# Patient Record
Sex: MALE | Race: ASIAN | NOT HISPANIC OR LATINO | Employment: UNEMPLOYED | URBAN - METROPOLITAN AREA
[De-identification: names, ages, dates, MRNs, and addresses within clinical notes are randomized per-mention and may not be internally consistent; named-entity substitution may affect disease eponyms.]

---

## 2020-05-29 ENCOUNTER — OFFICE VISIT (OUTPATIENT)
Dept: URGENT CARE | Facility: CLINIC | Age: 55
End: 2020-05-29
Payer: COMMERCIAL

## 2020-05-29 VITALS
DIASTOLIC BLOOD PRESSURE: 84 MMHG | SYSTOLIC BLOOD PRESSURE: 135 MMHG | OXYGEN SATURATION: 99 % | HEIGHT: 64 IN | HEART RATE: 92 BPM | WEIGHT: 129 LBS | TEMPERATURE: 98.7 F | BODY MASS INDEX: 22.02 KG/M2 | RESPIRATION RATE: 15 BRPM

## 2020-05-29 DIAGNOSIS — L98.9 LESION OF SKIN OF SCALP: Primary | ICD-10-CM

## 2020-05-29 PROCEDURE — 99213 OFFICE O/P EST LOW 20 MIN: CPT | Performed by: NURSE PRACTITIONER

## 2020-11-09 PROCEDURE — 88305 TISSUE EXAM BY PATHOLOGIST: CPT | Performed by: PATHOLOGY

## 2020-11-10 ENCOUNTER — LAB REQUISITION (OUTPATIENT)
Dept: LAB | Facility: HOSPITAL | Age: 55
End: 2020-11-10
Payer: COMMERCIAL

## 2020-11-10 DIAGNOSIS — D48.5 NEOPLASM OF UNCERTAIN BEHAVIOR OF SKIN: ICD-10-CM

## 2021-03-30 DIAGNOSIS — Z23 ENCOUNTER FOR IMMUNIZATION: ICD-10-CM

## 2021-06-10 ENCOUNTER — TELEPHONE (OUTPATIENT)
Dept: PREADMISSION TESTING | Facility: HOSPITAL | Age: 56
End: 2021-06-10

## 2021-06-10 ENCOUNTER — TELEPHONE (OUTPATIENT)
Dept: GASTROENTEROLOGY | Facility: AMBULARY SURGERY CENTER | Age: 56
End: 2021-06-10

## 2021-06-11 ENCOUNTER — ANESTHESIA EVENT (OUTPATIENT)
Dept: GASTROENTEROLOGY | Facility: AMBULARY SURGERY CENTER | Age: 56
End: 2021-06-11

## 2021-06-11 ENCOUNTER — HOSPITAL ENCOUNTER (OUTPATIENT)
Dept: GASTROENTEROLOGY | Facility: AMBULARY SURGERY CENTER | Age: 56
Setting detail: OUTPATIENT SURGERY
Discharge: HOME | End: 2021-06-11
Attending: SURGERY | Admitting: SURGERY
Payer: COMMERCIAL

## 2021-06-11 ENCOUNTER — ANESTHESIA (OUTPATIENT)
Dept: GASTROENTEROLOGY | Facility: AMBULARY SURGERY CENTER | Age: 56
End: 2021-06-11

## 2021-06-11 VITALS
SYSTOLIC BLOOD PRESSURE: 111 MMHG | HEIGHT: 64 IN | OXYGEN SATURATION: 98 % | DIASTOLIC BLOOD PRESSURE: 69 MMHG | BODY MASS INDEX: 21.17 KG/M2 | RESPIRATION RATE: 17 BRPM | TEMPERATURE: 98 F | HEART RATE: 71 BPM | WEIGHT: 124 LBS

## 2021-06-11 DIAGNOSIS — Z12.11 ENCOUNTER FOR SCREENING FOR MALIGNANT NEOPLASM OF COLON: ICD-10-CM

## 2021-06-11 PROBLEM — E11.9 DIABETES MELLITUS, TYPE 2 (HCC): Status: ACTIVE | Noted: 2021-06-11

## 2021-06-11 PROBLEM — J45.909 ASTHMA: Status: ACTIVE | Noted: 2021-06-11

## 2021-06-11 LAB — GLUCOSE SERPL-MCNC: 124 MG/DL (ref 65–140)

## 2021-06-11 PROCEDURE — 82948 REAGENT STRIP/BLOOD GLUCOSE: CPT

## 2021-06-11 RX ORDER — SODIUM CHLORIDE, SODIUM LACTATE, POTASSIUM CHLORIDE, CALCIUM CHLORIDE 600; 310; 30; 20 MG/100ML; MG/100ML; MG/100ML; MG/100ML
125 INJECTION, SOLUTION INTRAVENOUS CONTINUOUS
Status: DISCONTINUED | OUTPATIENT
Start: 2021-06-11 | End: 2021-06-15 | Stop reason: HOSPADM

## 2021-06-11 RX ORDER — PROPOFOL 10 MG/ML
INJECTION, EMULSION INTRAVENOUS CONTINUOUS PRN
Status: DISCONTINUED | OUTPATIENT
Start: 2021-06-11 | End: 2021-06-11

## 2021-06-11 RX ORDER — PROPOFOL 10 MG/ML
INJECTION, EMULSION INTRAVENOUS AS NEEDED
Status: DISCONTINUED | OUTPATIENT
Start: 2021-06-11 | End: 2021-06-11

## 2021-06-11 RX ORDER — LIDOCAINE HYDROCHLORIDE 10 MG/ML
INJECTION, SOLUTION EPIDURAL; INFILTRATION; INTRACAUDAL; PERINEURAL AS NEEDED
Status: DISCONTINUED | OUTPATIENT
Start: 2021-06-11 | End: 2021-06-11

## 2021-06-11 RX ADMIN — LIDOCAINE HYDROCHLORIDE 50 MG: 10 INJECTION, SOLUTION EPIDURAL; INFILTRATION; INTRACAUDAL at 13:56

## 2021-06-11 RX ADMIN — PROPOFOL 100 MG: 10 INJECTION, EMULSION INTRAVENOUS at 13:56

## 2021-06-11 RX ADMIN — SODIUM CHLORIDE, SODIUM LACTATE, POTASSIUM CHLORIDE, AND CALCIUM CHLORIDE: .6; .31; .03; .02 INJECTION, SOLUTION INTRAVENOUS at 13:51

## 2021-06-11 RX ADMIN — PROPOFOL 100 MCG/KG/MIN: 10 INJECTION, EMULSION INTRAVENOUS at 13:56

## 2021-06-11 NOTE — ANESTHESIA PREPROCEDURE EVALUATION
Procedure:  COLONOSCOPY    Relevant Problems   ENDO   (+) Diabetes mellitus, type 2 (HCC)      PULMONARY   (+) Asthma        Physical Exam    Airway    Mallampati score: II  TM Distance: >3 FB  Neck ROM: full     Dental   No notable dental hx     Cardiovascular  Cardiovascular exam normal    Pulmonary  Pulmonary exam normal     Other Findings        Anesthesia Plan  ASA Score- 2     Anesthesia Type- IV sedation with anesthesia with ASA Monitors  Additional Monitors:   Airway Plan:     Comment: No labs  Plan Factors-Exercise tolerance (METS): >4 METS  Chart reviewed  Patient is not a current smoker  Patient not instructed to abstain from smoking on day of procedure  Patient did not smoke on day of surgery  Induction- intravenous  Postoperative Plan-     Informed Consent- Anesthetic plan and risks discussed with patient  I personally reviewed this patient with the CRNA  Discussed and agreed on the Anesthesia Plan with the CRNA  Gerhardt Sep

## 2021-11-13 ENCOUNTER — APPOINTMENT (OUTPATIENT)
Dept: LAB | Facility: HOSPITAL | Age: 56
End: 2021-11-13
Payer: COMMERCIAL

## 2021-11-13 DIAGNOSIS — E13.69 OTHER SPECIFIED DIABETES MELLITUS WITH OTHER SPECIFIED COMPLICATION, UNSPECIFIED WHETHER LONG TERM INSULIN USE (HCC): ICD-10-CM

## 2021-11-13 DIAGNOSIS — Z00.01 ENCOUNTER FOR GENERAL ADULT MEDICAL EXAMINATION WITH ABNORMAL FINDINGS: ICD-10-CM

## 2021-11-13 DIAGNOSIS — R35.0 URINARY FREQUENCY: ICD-10-CM

## 2021-11-13 DIAGNOSIS — E78.2 MIXED HYPERLIPIDEMIA: ICD-10-CM

## 2021-11-13 LAB
25(OH)D3 SERPL-MCNC: 36.9 NG/ML (ref 30–100)
ALBUMIN SERPL BCP-MCNC: 4.3 G/DL (ref 3.5–5)
ALP SERPL-CCNC: 34 U/L (ref 46–116)
ALT SERPL W P-5'-P-CCNC: 37 U/L (ref 12–78)
ANION GAP SERPL CALCULATED.3IONS-SCNC: 5 MMOL/L (ref 4–13)
AST SERPL W P-5'-P-CCNC: 21 U/L (ref 5–45)
BILIRUB SERPL-MCNC: 0.53 MG/DL (ref 0.2–1)
BUN SERPL-MCNC: 14 MG/DL (ref 5–25)
CALCIUM SERPL-MCNC: 9.3 MG/DL (ref 8.3–10.1)
CHLORIDE SERPL-SCNC: 103 MMOL/L (ref 100–108)
CHOLEST SERPL-MCNC: 194 MG/DL (ref 50–200)
CO2 SERPL-SCNC: 30 MMOL/L (ref 21–32)
CREAT SERPL-MCNC: 1.03 MG/DL (ref 0.6–1.3)
CRP SERPL QL: <0.5 MG/L
EST. AVERAGE GLUCOSE BLD GHB EST-MCNC: 117 MG/DL
GFR SERPL CREATININE-BSD FRML MDRD: 81 ML/MIN/1.73SQ M
GLUCOSE P FAST SERPL-MCNC: 149 MG/DL (ref 65–99)
HBA1C MFR BLD: 5.7 %
HDLC SERPL-MCNC: 43 MG/DL
LDLC SERPL CALC-MCNC: 115 MG/DL (ref 0–100)
NONHDLC SERPL-MCNC: 151 MG/DL
POTASSIUM SERPL-SCNC: 4.8 MMOL/L (ref 3.5–5.3)
PROT SERPL-MCNC: 7.4 G/DL (ref 6.4–8.2)
PSA SERPL-MCNC: 0.3 NG/ML (ref 0–4)
SODIUM SERPL-SCNC: 138 MMOL/L (ref 136–145)
TRIGL SERPL-MCNC: 180 MG/DL
TSH SERPL DL<=0.05 MIU/L-ACNC: 0.97 UIU/ML (ref 0.36–3.74)

## 2021-11-13 PROCEDURE — 83036 HEMOGLOBIN GLYCOSYLATED A1C: CPT

## 2021-11-13 PROCEDURE — 36415 COLL VENOUS BLD VENIPUNCTURE: CPT

## 2021-11-13 PROCEDURE — 84443 ASSAY THYROID STIM HORMONE: CPT

## 2021-11-13 PROCEDURE — 86140 C-REACTIVE PROTEIN: CPT

## 2021-11-13 PROCEDURE — G0103 PSA SCREENING: HCPCS

## 2021-11-13 PROCEDURE — 82306 VITAMIN D 25 HYDROXY: CPT

## 2021-11-13 PROCEDURE — 80053 COMPREHEN METABOLIC PANEL: CPT

## 2021-11-13 PROCEDURE — 80061 LIPID PANEL: CPT

## 2021-12-14 ENCOUNTER — IMMUNIZATIONS (OUTPATIENT)
Dept: FAMILY MEDICINE CLINIC | Facility: HOSPITAL | Age: 56
End: 2021-12-14

## 2021-12-14 DIAGNOSIS — Z23 ENCOUNTER FOR IMMUNIZATION: Primary | ICD-10-CM

## 2021-12-14 PROCEDURE — 0064A COVID-19 MODERNA VACC 0.25 ML BOOSTER: CPT

## 2021-12-14 PROCEDURE — 91306 COVID-19 MODERNA VACC 0.25 ML BOOSTER: CPT

## 2022-07-26 ENCOUNTER — APPOINTMENT (OUTPATIENT)
Dept: LAB | Facility: HOSPITAL | Age: 57
End: 2022-07-26
Payer: COMMERCIAL

## 2022-07-26 DIAGNOSIS — R35.0 URINARY FREQUENCY: ICD-10-CM

## 2022-07-26 DIAGNOSIS — Z11.59 ENCOUNTER FOR SCREENING FOR OTHER VIRAL DISEASES: ICD-10-CM

## 2022-07-26 DIAGNOSIS — E13.69 OTHER SPECIFIED DIABETES MELLITUS WITH OTHER SPECIFIED COMPLICATION, WITHOUT LONG-TERM CURRENT USE OF INSULIN (HCC): ICD-10-CM

## 2022-07-26 DIAGNOSIS — E78.2 MIXED HYPERLIPIDEMIA: ICD-10-CM

## 2022-07-26 DIAGNOSIS — E55.9 AVITAMINOSIS D: ICD-10-CM

## 2022-07-26 LAB
ALBUMIN SERPL BCP-MCNC: 4.2 G/DL (ref 3.5–5)
ALP SERPL-CCNC: 37 U/L (ref 46–116)
ALT SERPL W P-5'-P-CCNC: 30 U/L (ref 12–78)
ANION GAP SERPL CALCULATED.3IONS-SCNC: 8 MMOL/L (ref 4–13)
AST SERPL W P-5'-P-CCNC: 19 U/L (ref 5–45)
BASOPHILS # BLD AUTO: 0.07 THOUSANDS/ΜL (ref 0–0.1)
BASOPHILS NFR BLD AUTO: 2 % (ref 0–1)
BILIRUB SERPL-MCNC: 0.6 MG/DL (ref 0.2–1)
BUN SERPL-MCNC: 13 MG/DL (ref 5–25)
CALCIUM SERPL-MCNC: 9.6 MG/DL (ref 8.3–10.1)
CHLORIDE SERPL-SCNC: 104 MMOL/L (ref 96–108)
CHOLEST SERPL-MCNC: 207 MG/DL
CO2 SERPL-SCNC: 30 MMOL/L (ref 21–32)
CREAT SERPL-MCNC: 1.1 MG/DL (ref 0.6–1.3)
EOSINOPHIL # BLD AUTO: 0.23 THOUSAND/ΜL (ref 0–0.61)
EOSINOPHIL NFR BLD AUTO: 6 % (ref 0–6)
ERYTHROCYTE [DISTWIDTH] IN BLOOD BY AUTOMATED COUNT: 12.1 % (ref 11.6–15.1)
GFR SERPL CREATININE-BSD FRML MDRD: 75 ML/MIN/1.73SQ M
GLUCOSE P FAST SERPL-MCNC: 163 MG/DL (ref 65–99)
HCT VFR BLD AUTO: 49.7 % (ref 36.5–49.3)
HCV AB SER QL: NORMAL
HDLC SERPL-MCNC: 44 MG/DL
HGB BLD-MCNC: 16.7 G/DL (ref 12–17)
IMM GRANULOCYTES # BLD AUTO: 0.02 THOUSAND/UL (ref 0–0.2)
IMM GRANULOCYTES NFR BLD AUTO: 1 % (ref 0–2)
LDLC SERPL CALC-MCNC: 120 MG/DL (ref 0–100)
LYMPHOCYTES # BLD AUTO: 1.09 THOUSANDS/ΜL (ref 0.6–4.47)
LYMPHOCYTES NFR BLD AUTO: 28 % (ref 14–44)
MCH RBC QN AUTO: 28.8 PG (ref 26.8–34.3)
MCHC RBC AUTO-ENTMCNC: 33.6 G/DL (ref 31.4–37.4)
MCV RBC AUTO: 86 FL (ref 82–98)
MONOCYTES # BLD AUTO: 0.28 THOUSAND/ΜL (ref 0.17–1.22)
MONOCYTES NFR BLD AUTO: 7 % (ref 4–12)
NEUTROPHILS # BLD AUTO: 2.17 THOUSANDS/ΜL (ref 1.85–7.62)
NEUTS SEG NFR BLD AUTO: 56 % (ref 43–75)
NONHDLC SERPL-MCNC: 163 MG/DL
NRBC BLD AUTO-RTO: 0 /100 WBCS
PLATELET # BLD AUTO: 222 THOUSANDS/UL (ref 149–390)
PMV BLD AUTO: 9.6 FL (ref 8.9–12.7)
POTASSIUM SERPL-SCNC: 4.8 MMOL/L (ref 3.5–5.3)
PROT SERPL-MCNC: 7.5 G/DL (ref 6.4–8.4)
RBC # BLD AUTO: 5.79 MILLION/UL (ref 3.88–5.62)
SODIUM SERPL-SCNC: 142 MMOL/L (ref 135–147)
TRIGL SERPL-MCNC: 213 MG/DL
WBC # BLD AUTO: 3.86 THOUSAND/UL (ref 4.31–10.16)

## 2022-07-26 PROCEDURE — 83036 HEMOGLOBIN GLYCOSYLATED A1C: CPT

## 2022-07-26 PROCEDURE — 36415 COLL VENOUS BLD VENIPUNCTURE: CPT

## 2022-07-26 PROCEDURE — 80053 COMPREHEN METABOLIC PANEL: CPT

## 2022-07-26 PROCEDURE — 80061 LIPID PANEL: CPT

## 2022-07-26 PROCEDURE — 85025 COMPLETE CBC W/AUTO DIFF WBC: CPT

## 2022-07-26 PROCEDURE — 86803 HEPATITIS C AB TEST: CPT

## 2022-07-27 LAB
EST. AVERAGE GLUCOSE BLD GHB EST-MCNC: 134 MG/DL
HBA1C MFR BLD: 6.3 %

## 2023-02-16 ENCOUNTER — TELEPHONE (OUTPATIENT)
Dept: OTHER | Facility: OTHER | Age: 58
End: 2023-02-16

## 2023-03-17 ENCOUNTER — CONSULT (OUTPATIENT)
Dept: ENDOCRINOLOGY | Facility: CLINIC | Age: 58
End: 2023-03-17

## 2023-03-17 VITALS
BODY MASS INDEX: 22.36 KG/M2 | TEMPERATURE: 98.3 F | OXYGEN SATURATION: 99 % | DIASTOLIC BLOOD PRESSURE: 92 MMHG | HEART RATE: 85 BPM | HEIGHT: 64 IN | SYSTOLIC BLOOD PRESSURE: 122 MMHG | WEIGHT: 131 LBS

## 2023-03-17 DIAGNOSIS — E78.2 MIXED HYPERLIPIDEMIA: ICD-10-CM

## 2023-03-17 DIAGNOSIS — R53.83 OTHER FATIGUE: ICD-10-CM

## 2023-03-17 DIAGNOSIS — E11.65 TYPE 2 DIABETES MELLITUS WITH HYPERGLYCEMIA, WITHOUT LONG-TERM CURRENT USE OF INSULIN (HCC): Primary | ICD-10-CM

## 2023-03-17 DIAGNOSIS — E55.9 VITAMIN D DEFICIENCY: ICD-10-CM

## 2023-03-17 RX ORDER — BLOOD-GLUCOSE,RECEIVER,CONT
1 EACH MISCELLANEOUS CONTINUOUS
Qty: 1 EACH | Refills: 0 | Status: SHIPPED | OUTPATIENT
Start: 2023-03-17

## 2023-03-17 RX ORDER — BLOOD-GLUCOSE SENSOR
1 EACH MISCELLANEOUS
Qty: 3 EACH | Refills: 5 | Status: SHIPPED | OUTPATIENT
Start: 2023-03-17

## 2023-03-17 NOTE — PROGRESS NOTES
New consult note      CC: Type 2 diabetes    History of Present Illness:   50-year-old male with type 2 diabetes >10 yrs, HTN, HLD, retinopathy, fatigue and vitamin D deficiency  He reports significant fatigue and general weakness and significant fasting and postprandial hyperglycemia in spite of lower and lower amount of food intake  He does not have any symptoms associated with hyperglycemia at this time but is extremely diligent with his diet  With severely restricted diet, he now feels weak to do any exercise  Home blood glucose monitoring: checks 3-4 times using [employer]  Over the last few months, reported time in range was 40% he seems to have both fasting and postprandial hyperglycemia  Current meds:  Janumet 50-500mg BID    Opthamology: yes - retinopathy  Podiatry:   vaccination: yes  Dental:  Pancreatitis: No    Ace/ARB: No  Statin: No  Thyroid issues: No    FH: mother - diabetes    Patient Active Problem List   Diagnosis   • Asthma   • [facility] ([facility] )     Past Medical History:   Diagnosis Date   • Diabetes mellitus [facility])       Past Surgical History:   Procedure Laterality Date   • CATARACT EXTRACTION     • SKIN CANCER EXCISION Right 10/2020    skin granuloma      History reviewed  No pertinent family history  Social History     Tobacco Use   • Smoking status: Never   • Smokeless tobacco: Never   Substance Use Topics   • Alcohol use: Never     No Known Allergies      Current Outpatient Medications:   •  sitaGLIPtin-metFORMIN (JANUMET)  MG per tablet, Take 1 tablet by mouth 2 (two) times a day with meals, Disp: , Rfl:     Review of Systems   Constitutional: Positive for activity change and appetite change  HENT: Negative  Eyes: Negative  Respiratory: Negative  Cardiovascular: Negative for chest pain  Gastrointestinal: Negative  Endocrine: Negative  Genitourinary: Negative  Musculoskeletal: Negative  Skin: Negative      Allergic/Immunologic: Negative  Neurological: Negative  Hematological: Negative  Psychiatric/Behavioral: Negative  All other systems reviewed and are negative  Physical Exam:  Body mass index is 22 49 kg/m²  /92 (BP Location: Left arm, Patient Position: Sitting, Cuff Size: Standard)   Pulse 85   Temp 98 3 °F (36 8 °C) (Tympanic)   Ht 5' 4" (1 626 m)   Wt 59 4 kg (131 lb)   SpO2 99%   BMI 22 49 kg/m²    Vitals:    03/17/23 0919   Weight: 59 4 kg (131 lb)        Physical Exam  Constitutional:       General: He is not in acute distress  Appearance: He is well-developed  He is not ill-appearing  HENT:      Head: Normocephalic and atraumatic  Nose: Nose normal       Mouth/Throat:      Pharynx: Oropharynx is clear  Eyes:      Extraocular Movements: Extraocular movements intact  Conjunctiva/sclera: Conjunctivae normal    Neck:      Thyroid: No thyromegaly  Cardiovascular:      Rate and Rhythm: Normal rate  Pulmonary:      Effort: Pulmonary effort is normal    Musculoskeletal:         General: No deformity  Cervical back: Normal range of motion  Skin:     Capillary Refill: Capillary refill takes less than 2 seconds  Coloration: Skin is not pale  Findings: No rash  Neurological:      Mental Status: He is alert and oriented to person, place, and time     Psychiatric:         Behavior: Behavior normal          Labs:   Lab Results   Component Value Date    HGBA1C 6 3 (H) 07/26/2022       Lab Results   Component Value Date    [id number] 0 966 11/13/2021       Lab Results   Component Value Date    CREATININE 1 10 07/26/2022    CREATININE 1 03 11/13/2021    BUN 13 07/26/2022    K 4 8 07/26/2022     07/26/2022    CO2 30 07/26/2022     eGFR   Date Value Ref Range Status   07/26/2022 75 ml/min/1 73sq m Final       Lab Results   Component Value Date    ALT 30 07/26/2022    AST 19 07/26/2022    ALKPHOS 37 (L) 07/26/2022       Lab Results   Component Value Date    CHOLESTEROL 207 (H) 07/26/2022    CHOLESTEROL 194 11/13/2021     Lab Results   Component Value Date    HDL 44 07/26/2022    HDL 43 11/13/2021     Lab Results   Component Value Date    TRIG 213 (H) 07/26/2022    TRIG 180 (H) 11/13/2021     Lab Results   Component Value Date    [place] 163 07/26/2022    [place] 151 11/13/2021         Impression:  1  Type 2 diabetes mellitus with hyperglycemia, without long-term current use of insulin (HCC)    2  Other fatigue    3  Vitamin D deficiency    4  Mixed hyperlipidemia         Plan:    Diagnoses and all orders for this visit:    Type 2 diabetes mellitus with hyperglycemia, without long-term current use of insulin ([facility] )  He is somewhat uncontrolled with both fasting and postprandial hyperglycemia based on SAGM but A1c was 6 3%  This seems somewhat discordant  His BMI is 22  Today we discussed all aspects of diabetes including pathophysiology, risk factors, complications, SAGM, CGM, diet, lifestyle modifications, medical fitness training, diabetes education, goals of therapy, follow up needs and medications including insulin, metformin and others  Advised to maintain goal blood sugars 70-180mg/dL  First will establish his beta cell function and rule out autoimmune diabetes given low BMI  Given strong family history on maternal family, daughter and others, he may have an underlying ANAHI syndrome  Advised about genetic testing for diagnosis  Today we agreed to initiate a personal CGM to get more accurate data, if he has low C-peptide, he may benefit from either insulin or sulfonylurea therapy  Advised a carb consistent diet without extreme restriction  Over next visits, will consider using a basal insulin which I expect will have overall improvement in glycemia as well as anabolic activity  Follow-up in 6 weeks  -     C-peptide; Future  -     Hemoglobin A1C; Future  -     Microalbumin / creatinine urine ratio; Future  -     Glutamic acid decarboxylase;  Future  -     IA2 Autoantibodies; Future  -     Continuous Blood Gluc  (Dexcom G7 ) JOHN; Use 1 each continuous  -     Continuous Blood Gluc Sensor (Dexcom G7 Sensor) MISC; Use 1 each every 7 days    Other fatigue  Differentials are multiple but typically, low BMI is associated with low quality of life and increased mental health issues in spite of CV benefit  Will work-up for common endocrine dysfunctions as listed below and review accordingly  -     T4, free; Future  -     TSH, 3rd generation; Future  -     Cortisol Level, AM Specimen; Future  -     DHEA-sulfate; Future    Vitamin D deficiency  -     Vitamin D 25 hydroxy; Future    Mixed hyperlipidemia  -     Lipid panel; Future    I have spent 42 minutes with patient today in which greater than 50% of this time was spent in counseling/coordination of care  Discussed with the patient and all questioned fully answered  He will call me if any problems arise  Educated/ Counseled patient on diagnostic test results, prognosis, risk vs benefit of treatment options, importance of treatment compliance, healthy life and lifestyle choices        [address]

## 2023-04-25 ENCOUNTER — LAB (OUTPATIENT)
Dept: LAB | Facility: HOSPITAL | Age: 58
End: 2023-04-25

## 2023-04-25 DIAGNOSIS — E55.9 VITAMIN D DEFICIENCY: ICD-10-CM

## 2023-04-25 DIAGNOSIS — E11.65 TYPE 2 DIABETES MELLITUS WITH HYPERGLYCEMIA, WITHOUT LONG-TERM CURRENT USE OF INSULIN (HCC): ICD-10-CM

## 2023-04-25 DIAGNOSIS — R53.83 OTHER FATIGUE: ICD-10-CM

## 2023-04-25 DIAGNOSIS — E78.2 MIXED HYPERLIPIDEMIA: ICD-10-CM

## 2023-04-25 LAB
25(OH)D3 SERPL-MCNC: 24.3 NG/ML (ref 30–100)
CHOLEST SERPL-MCNC: 218 MG/DL
CREAT UR-MCNC: 49.2 MG/DL
HDLC SERPL-MCNC: 45 MG/DL
LDLC SERPL CALC-MCNC: 99 MG/DL (ref 0–100)
MICROALBUMIN UR-MCNC: 30 MG/L (ref 0–20)
MICROALBUMIN/CREAT 24H UR: 61 MG/G CREATININE (ref 0–30)
NONHDLC SERPL-MCNC: 173 MG/DL
TRIGL SERPL-MCNC: 371 MG/DL
TSH SERPL DL<=0.05 MIU/L-ACNC: 2.17 UIU/ML (ref 0.45–4.5)

## 2023-04-26 LAB — T4 FREE SERPL-MCNC: 1.01 NG/DL (ref 0.76–1.46)

## 2023-04-27 LAB
C PEPTIDE SERPL-MCNC: 2 NG/ML (ref 1.1–4.4)
DHEA-S SERPL-MCNC: 387 UG/DL (ref 48.9–344.2)

## 2023-04-28 ENCOUNTER — OFFICE VISIT (OUTPATIENT)
Dept: ENDOCRINOLOGY | Facility: CLINIC | Age: 58
End: 2023-04-28

## 2023-04-28 VITALS
HEIGHT: 64 IN | OXYGEN SATURATION: 99 % | SYSTOLIC BLOOD PRESSURE: 132 MMHG | BODY MASS INDEX: 23.22 KG/M2 | DIASTOLIC BLOOD PRESSURE: 86 MMHG | HEART RATE: 98 BPM | TEMPERATURE: 97.5 F | WEIGHT: 136 LBS

## 2023-04-28 DIAGNOSIS — E55.9 VITAMIN D DEFICIENCY: ICD-10-CM

## 2023-04-28 DIAGNOSIS — E11.65 TYPE 2 DIABETES MELLITUS WITH HYPERGLYCEMIA, WITHOUT LONG-TERM CURRENT USE OF INSULIN (HCC): Primary | ICD-10-CM

## 2023-04-28 DIAGNOSIS — E78.2 MIXED HYPERLIPIDEMIA: ICD-10-CM

## 2023-04-28 DIAGNOSIS — E24.9 HYPERCORTISOLISM (HCC): ICD-10-CM

## 2023-04-28 LAB
EST. AVERAGE GLUCOSE BLD GHB EST-MCNC: 151 MG/DL
GAD65 AB SER-ACNC: <5 U/ML (ref 0–5)
HBA1C MFR BLD: 6.9 %

## 2023-04-28 RX ORDER — METFORMIN HYDROCHLORIDE 500 MG/1
500 TABLET, EXTENDED RELEASE ORAL 2 TIMES DAILY WITH MEALS
Qty: 180 TABLET | Refills: 1 | Status: SHIPPED | OUTPATIENT
Start: 2023-04-28

## 2023-04-28 RX ORDER — BLOOD-GLUCOSE SENSOR
1 EACH MISCELLANEOUS
Qty: 3 EACH | Refills: 5 | Status: SHIPPED | OUTPATIENT
Start: 2023-04-28

## 2023-04-28 RX ORDER — DEXAMETHASONE 1 MG
TABLET ORAL
Qty: 1 TABLET | Refills: 0 | Status: SHIPPED | OUTPATIENT
Start: 2023-04-28

## 2023-04-28 NOTE — PROGRESS NOTES
Follow-up Patient Progress Note      CC: Type 2 diabetes     History of Present Illness:   81-year-old male with type 2 diabetes >10 yrs, HTN, HLD, retinopathy, microalbuminuria, fatigue and vitamin D deficiency  Last visit was 3/17/23  Since last visit he gained 5 lbs  Reports waking up tired sometimes  Continues to have fatigue  He reports significant fatigue and general weakness and significant fasting and postprandial hyperglycemia in spite of lower and lower amount of food intake  He does not have any symptoms associated with hyperglycemia at this time but is extremely diligent with his diet  With severely restricted diet, he now feels weak to do any exercise      Home blood glucose monitoring: checks 3-4 times using [employer]  Review of data for the last 90 days shows fasting hyperglycemia in the 180-200 mg/dL range rest of the days glycemia is in the less than 180 mg/dL range      Current meds:  Janumet 50-500mg BID     Opthamology: yes - retinopathy  Podiatry:   vaccination: yes  Dental:  Pancreatitis: No     Ace/ARB: No  Statin: No  Thyroid issues: No     FH: mother - diabetes    Patient Active Problem List   Diagnosis    Asthma    Type 2 diabetes mellitus with hyperglycemia, without long-term current use of insulin ([facility])     Past Medical History:   Diagnosis Date    Diabetes mellitus ([facility] )       Past Surgical History:   Procedure Laterality Date    CATARACT EXTRACTION      SKIN CANCER EXCISION Right 10/2020    skin granuloma      History reviewed  No pertinent family history    Social History     Tobacco Use    Smoking status: Never    Smokeless tobacco: Never   Substance Use Topics    Alcohol use: Never     No Known Allergies      Current Outpatient Medications:     sitaGLIPtin-metFORMIN (JANUMET)  MG per tablet, Take 1 tablet by mouth 2 (two) times a day with meals, Disp: , Rfl:     Continuous Blood Gluc  (Dexcom G7 ) JOHN, Use 1 each continuous, Disp: 1 each, "Rfl: 0    Continuous Blood Gluc Sensor (Dexcom G7 Sensor) MISC, Use 1 each every 7 days, Disp: 3 each, Rfl: 5    Review of Systems   Constitutional: Positive for activity change and appetite change  HENT: Negative  Eyes: Negative  Respiratory: Negative  Cardiovascular: Negative for chest pain  Gastrointestinal: Negative  Endocrine: Negative  Genitourinary: Negative  Musculoskeletal: Negative  Skin: Negative  Allergic/Immunologic: Negative  Neurological: Negative  Hematological: Negative  Psychiatric/Behavioral: Negative  All other systems reviewed and are negative  Physical Exam:  Body mass index is 23 34 kg/m²  /86 (BP Location: Left arm, Patient Position: Sitting, Cuff Size: Standard)   Pulse 98   Temp 97 5 °F (36 4 °C) (Tympanic)   Ht 5' 4\" (1 626 m)   Wt 61 7 kg (136 lb)   SpO2 99%   BMI 23 34 kg/m²    Vitals:    04/28/23 0937   Weight: 61 7 kg (136 lb)        Physical Exam  Constitutional:       General: He is not in acute distress  Appearance: He is well-developed  He is not ill-appearing  HENT:      Head: Normocephalic and atraumatic  Nose: Nose normal       Mouth/Throat:      Pharynx: Oropharynx is clear  Eyes:      Extraocular Movements: Extraocular movements intact  Conjunctiva/sclera: Conjunctivae normal    Neck:      Thyroid: No thyromegaly  Cardiovascular:      Rate and Rhythm: Normal rate  Pulmonary:      Effort: Pulmonary effort is normal    Musculoskeletal:         General: No deformity  Cervical back: Normal range of motion  Skin:     Capillary Refill: Capillary refill takes less than 2 seconds  Coloration: Skin is not pale  Findings: No rash  Neurological:      Mental Status: He is alert and oriented to person, place, and time     Psychiatric:         Behavior: Behavior normal          Labs:   Lab Results   Component Value Date    HGBA1C 6 3 (H) 07/26/2022       Lab Results   Component Value Date    " [id number] 2 165 04/25/2023       Lab Results   Component Value Date    CREATININE 1 10 07/26/2022    CREATININE 1 03 11/13/2021    BUN 13 07/26/2022    K 4 8 07/26/2022     07/26/2022    CO2 30 07/26/2022     eGFR   Date Value Ref Range Status   07/26/2022 75 ml/min/1 73sq m Final       Lab Results   Component Value Date    ALT 30 07/26/2022    AST 19 07/26/2022    ALKPHOS 37 (L) 07/26/2022       Lab Results   Component Value Date    CHOLESTEROL 218 (H) 04/25/2023    CHOLESTEROL 207 (H) 07/26/2022    CHOLESTEROL 194 11/13/2021     Lab Results   Component Value Date    HDL 45 04/25/2023    HDL 44 07/26/2022    HDL 43 11/13/2021     Lab Results   Component Value Date    TRIG 371 (H) 04/25/2023    TRIG 213 (H) 07/26/2022    TRIG 180 (H) 11/13/2021     Lab Results   Component Value Date    [place] 173 04/25/2023    [place] 163 07/26/2022    [place] 151 11/13/2021         Impression:  1  Type 2 diabetes mellitus with hyperglycemia, without long-term current use of insulin ([address] )    2  Mixed hyperlipidemia    3  Vitamin D deficiency    4  Hypercortisolism ([address] )         Plan:    Lily Cruz was seen today for follow-up  Diagnoses and all orders for this visit:    Type 2 diabetes mellitus with hyperglycemia, without long-term current use of insulin ([address] )  He is reasonably controlled with an A1c of 6 9% but reported capillary glucose is worse than as predicted by A1c  Today we agreed to increase metformin and continue Januvia at current dose  This was achieved by adding metformin to his Janumet  Since he also reports symptoms of fatigue, muscle weakness, central adiposity and fasting hyperglycemia, will rule out underlying secondary causes for hyperglycemia and diabetes like Cushing's syndrome and untreated obstructive sleep apnea  Note DHEA-S was elevated  In order to better elucidate the glycemia, advised patient to get a personal CGM    For now we will use a professional CGM to get more accurate data     Follow-up in 3 months  -     Cortisol Level, AM Specimen; Future  -     dexamethasone (DECADRON) 1 mg tablet; Take 1 tab at 11pm and get cortisol levels checked between 7:00 a m  and 9:00 a m  on the next morning   -     Hemoglobin A1C; Future  -     Ambulatory referral to Sleep Medicine; Future  -     Comprehensive metabolic panel; Future  -     metFORMIN (GLUCOPHAGE-XR) 500 mg 24 hr tablet; Take 1 tablet (500 mg total) by mouth 2 (two) times a day with meals  -     Continuous Blood Gluc Sensor (Dexcom G7 Sensor); Use 1 Device every 10 days  -     Continous glucose monitoring willow placement; Future  -     Continous glucose monitoring willow intrepretation; Future    Mixed hyperlipidemia  He has elevated cholesterol and triglycerides  His 10-year ASCVD risk score is 14%  He would benefit from statin therapy  Advised to make diet and lifestyle modifications  We will review initiation of statins next visit  Choices would be atorvastatin 40 mg daily or Crestor  Vitamin D deficiency  Advised vitamin D3 5000 IU daily OTC  Hypercortisolism  His DHEA-S was elevated  This can be associated with ACTH mediated hypercortisolism  This can be either associated with Cushing's disease or Cushing syndrome  We will first establish a true hypercortisol state based on which further testing may be required  I have spent 32 minutes with patient today in which greater than 50% of this time was spent in counseling/coordination of care  Discussed with the patient and all questioned fully answered  He will call me if any problems arise  Educated/ Counseled patient on diagnostic test results, prognosis, risk vs benefit of treatment options, importance of treatment compliance, healthy life and lifestyle choices        [address]

## 2023-05-01 ENCOUNTER — APPOINTMENT (OUTPATIENT)
Dept: LAB | Facility: HOSPITAL | Age: 58
End: 2023-05-01

## 2023-05-01 DIAGNOSIS — E11.65 TYPE 2 DIABETES MELLITUS WITH HYPERGLYCEMIA, WITHOUT LONG-TERM CURRENT USE OF INSULIN (HCC): ICD-10-CM

## 2023-05-01 LAB
ALBUMIN SERPL BCP-MCNC: 4.8 G/DL (ref 3.5–5)
ALP SERPL-CCNC: 35 U/L (ref 34–104)
ALT SERPL W P-5'-P-CCNC: 26 U/L (ref 7–52)
ANION GAP SERPL CALCULATED.3IONS-SCNC: 6 MMOL/L (ref 4–13)
AST SERPL W P-5'-P-CCNC: 18 U/L (ref 13–39)
BILIRUB SERPL-MCNC: 0.66 MG/DL (ref 0.2–1)
BUN SERPL-MCNC: 16 MG/DL (ref 5–25)
CALCIUM SERPL-MCNC: 10.1 MG/DL (ref 8.4–10.2)
CHLORIDE SERPL-SCNC: 103 MMOL/L (ref 96–108)
CO2 SERPL-SCNC: 29 MMOL/L (ref 21–32)
CREAT SERPL-MCNC: 1.11 MG/DL (ref 0.6–1.3)
GFR SERPL CREATININE-BSD FRML MDRD: 73 ML/MIN/1.73SQ M
GLUCOSE P FAST SERPL-MCNC: 167 MG/DL (ref 65–99)
ISLET CELL512 AB SER-ACNC: <7.5 U/ML
POTASSIUM SERPL-SCNC: 4.2 MMOL/L (ref 3.5–5.3)
PROT SERPL-MCNC: 7.4 G/DL (ref 6.4–8.4)
SODIUM SERPL-SCNC: 138 MMOL/L (ref 135–147)

## 2023-05-02 ENCOUNTER — CONSULT (OUTPATIENT)
Dept: PULMONOLOGY | Facility: CLINIC | Age: 58
End: 2023-05-02

## 2023-05-02 VITALS
HEIGHT: 64 IN | OXYGEN SATURATION: 98 % | SYSTOLIC BLOOD PRESSURE: 118 MMHG | RESPIRATION RATE: 18 BRPM | WEIGHT: 136 LBS | BODY MASS INDEX: 23.22 KG/M2 | HEART RATE: 96 BPM | DIASTOLIC BLOOD PRESSURE: 76 MMHG | TEMPERATURE: 97.7 F

## 2023-05-02 DIAGNOSIS — G47.33 OSA (OBSTRUCTIVE SLEEP APNEA): Primary | ICD-10-CM

## 2023-05-02 DIAGNOSIS — J45.20 MILD INTERMITTENT ASTHMA WITHOUT COMPLICATION: ICD-10-CM

## 2023-05-02 DIAGNOSIS — E11.65 TYPE 2 DIABETES MELLITUS WITH HYPERGLYCEMIA, WITHOUT LONG-TERM CURRENT USE OF INSULIN (HCC): ICD-10-CM

## 2023-05-02 LAB
CORTIS AM PEAK SERPL-MCNC: 0.7 UG/DL (ref 4.2–22.4)
EST. AVERAGE GLUCOSE BLD GHB EST-MCNC: 148 MG/DL
HBA1C MFR BLD: 6.8 %

## 2023-05-02 NOTE — PROGRESS NOTES
"Assessment/Plan:    NANCY (obstructive sleep apnea)  Ratna Chawla \"Juan F\" has history of snoring, interrupted sleep and waking up not refreshed  He occasionally feels sleepy and tired during the day  He has diabetes as comorbidity  On clinical examination, his chest was clear to auscultation  He had oropharyngeal crowding Mallampati class III  He likely has significant obstructive sleep apnea  His [place] sleepiness score is 9 out of 24  I have ordered an overnight home sleep study to further evaluate him and treat him  I had a long discussion with him and I counseled him regarding sleep hygiene  I advised him regarding driving and use of sedative medications and alcohol  Asthma  He has previous history of asthma  He was previously using inhaler  He has history of allergies  Currently his exercise tolerance is unrestricted and does not have any significant cough or phlegm or wheeze or chest pain  His chest was clear to auscultation  Diagnoses and all orders for this visit:    NANCY (obstructive sleep apnea)  -     Home Study; Future    Type 2 diabetes mellitus with hyperglycemia, without long-term current use of insulin ([facility] )  -     Ambulatory referral to Sleep Medicine    Mild intermittent asthma without complication          Subjective:      Patient ID: Ratna Chawla is a 65 y o  male  Ratna Chawla \"Juan F\" was referred for sleep study to evaluate for sleep breathing disorder  He has been a diabetic and his blood sugar has been difficult to control  He has history of snoring, interrupted sleep and waking up not refreshed  He occasionally feels sleepy and tired during the day  He usually goes to sleep around 12 and wakes up around 630  He denied any morning headache  He drinks 2 to 3 cups of coffee  He has no problem driving  His [place] sleepiness score was 9 out of 24  He denied any dizziness or lightheadedness  He has no hypertension    No history of previous stroke " "or heart problems  He has not been sleep tested before  He is not on any sedating medications  I have advised him regarding sleep hygiene, driving and use of sedating medications  He has history of asthma and was previously using  inhaler  He did currently denies any significant shortness of breath or cough or phlegm or wheeze or chest pain  He has occasional allergies  The following portions of the patient's history were reviewed and updated as appropriate: allergies, current medications, past family history, past medical history, past social history, past surgical history and problem list     Review of Systems   Constitutional: Negative for appetite change, chills, fatigue and fever  HENT: Positive for rhinorrhea  Negative for hearing loss, sneezing, sore throat, trouble swallowing and voice change  Eyes: Positive for visual disturbance  Respiratory: Negative for cough, chest tightness, shortness of breath and wheezing  Cardiovascular: Negative for chest pain, palpitations and leg swelling  Gastrointestinal: Negative for abdominal pain, constipation, diarrhea, nausea and vomiting  Genitourinary: Negative for dysuria, frequency and urgency  Musculoskeletal: Negative for arthralgias, gait problem and joint swelling  Allergic/Immunologic: Positive for environmental allergies  Neurological: Negative for dizziness, syncope, light-headedness and headaches  Psychiatric/Behavioral: Positive for sleep disturbance  Negative for agitation and confusion  The patient is not nervous/anxious  Objective:      /76 (BP Location: Left arm, Patient Position: Sitting, Cuff Size: Standard)   Pulse 96   Temp 97 7 °F (36 5 °C) (Tympanic)   Resp 18   Ht 5' 4\" (1 626 m)   Wt 61 7 kg (136 lb)   SpO2 98%   BMI 23 34 kg/m²          Physical Exam  Vitals reviewed  Constitutional:       General: He is not in acute distress       Appearance: He is not ill-appearing, toxic-appearing or " diaphoretic  HENT:      Head: Normocephalic  Mouth/Throat:      Mouth: Mucous membranes are moist       Pharynx: Oropharynx is clear  Comments: Mallampatti class 3 oropharyngeal crowding  Eyes:      General: No scleral icterus  Conjunctiva/sclera: Conjunctivae normal    Cardiovascular:      Rate and Rhythm: Normal rate and regular rhythm  Heart sounds: Normal heart sounds  No murmur heard  Pulmonary:      Effort: Pulmonary effort is normal  No respiratory distress  Breath sounds: Normal breath sounds  No stridor  No wheezing, rhonchi or rales  Abdominal:      General: Bowel sounds are normal       Palpations: Abdomen is soft  Tenderness: There is no abdominal tenderness  There is no guarding  Musculoskeletal:      Cervical back: No rigidity  Right lower leg: No edema  Left lower leg: No edema  Lymphadenopathy:      Cervical: No cervical adenopathy  Skin:     Coloration: Skin is not jaundiced or pale  Findings: No rash  Neurological:      Mental Status: He is alert and oriented to person, place, and time  Gait: Gait normal    Psychiatric:         Mood and Affect: Mood normal          Behavior: Behavior normal          Thought Content:  Thought content normal

## 2023-05-02 NOTE — ASSESSMENT & PLAN NOTE
He has previous history of asthma  He was previously using inhaler  He has history of allergies  Currently his exercise tolerance is unrestricted and does not have any significant cough or phlegm or wheeze or chest pain  His chest was clear to auscultation

## 2023-05-02 NOTE — ASSESSMENT & PLAN NOTE
"Lena [place] \"Juan F\" has history of snoring, interrupted sleep and waking up not refreshed  He occasionally feels sleepy and tired during the day  He has diabetes as comorbidity  On clinical examination, his chest was clear to auscultation  He had oropharyngeal crowding Mallampati class III  He likely has significant obstructive sleep apnea  His [place] sleepiness score is 9 out of 24  I have ordered an overnight home sleep study to further evaluate him and treat him  I had a long discussion with him and I counseled him regarding sleep hygiene  I advised him regarding driving and use of sedative medications and alcohol    "

## 2023-05-25 ENCOUNTER — HOSPITAL ENCOUNTER (OUTPATIENT)
Dept: SLEEP CENTER | Facility: CLINIC | Age: 58
Discharge: HOME | End: 2023-05-25

## 2023-05-25 DIAGNOSIS — G47.33 OSA (OBSTRUCTIVE SLEEP APNEA): ICD-10-CM

## 2023-05-26 NOTE — PROGRESS NOTES
Home Sleep Study Documentation    HOME STUDY DEVICE: Noxturnal yes                                           Mily G3 no      Pre-Sleep Home Study:    Set-up and instructions performed by: MAGDA Vo    Technician performed demonstration for Patient: yes    Return demonstration performed by Patient: yes    Written instructions provided to Patient: yes    Patient signed consent form: yes        Post-Sleep Home Study:    Additional comments by Patient:     Home Sleep Study Failed:no:    Failure reason: N/A    Reported or Detected: N/A    Scored by: Christina Sanford

## 2023-06-09 ENCOUNTER — TELEPHONE (OUTPATIENT)
Dept: SLEEP CENTER | Facility: CLINIC | Age: 58
End: 2023-06-09

## 2023-06-09 NOTE — TELEPHONE ENCOUNTER
Sleep study resulted and shows moderate sleep apnea with significant oxygen desaturation  Dr Ludie Boxer recommends CPAP therapy and left message for patient with his recommendation on 5/31/23  Called and left message advising sleep study is resulted and to contact Dr Ludie Boxer to follow up for the next steps

## 2023-06-30 DIAGNOSIS — G47.33 OSA (OBSTRUCTIVE SLEEP APNEA): Primary | ICD-10-CM

## 2023-07-10 ENCOUNTER — TELEPHONE (OUTPATIENT)
Dept: SLEEP CENTER | Facility: CLINIC | Age: 58
End: 2023-07-10

## 2023-07-10 LAB

## 2023-07-10 NOTE — TELEPHONE ENCOUNTER
I set this pt. up today in [facility]. on a ResMed S11 set at 5-15cm and gave the N30i (SW) mask.

## 2023-07-17 ENCOUNTER — OFFICE VISIT (OUTPATIENT)
Dept: PULMONOLOGY | Facility: CLINIC | Age: 58
End: 2023-07-17
Payer: COMMERCIAL

## 2023-07-17 VITALS
OXYGEN SATURATION: 98 % | WEIGHT: 135 LBS | SYSTOLIC BLOOD PRESSURE: 120 MMHG | TEMPERATURE: 97.9 F | HEART RATE: 84 BPM | HEIGHT: 64 IN | DIASTOLIC BLOOD PRESSURE: 78 MMHG | BODY MASS INDEX: 23.05 KG/M2

## 2023-07-17 DIAGNOSIS — E11.65 TYPE 2 DIABETES MELLITUS WITH HYPERGLYCEMIA, WITHOUT LONG-TERM CURRENT USE OF INSULIN (HCC): ICD-10-CM

## 2023-07-17 DIAGNOSIS — G47.33 OSA (OBSTRUCTIVE SLEEP APNEA): Primary | ICD-10-CM

## 2023-07-17 PROCEDURE — 99213 OFFICE O/P EST LOW 20 MIN: CPT | Performed by: INTERNAL MEDICINE

## 2023-07-17 RX ORDER — ACYCLOVIR 400 MG/1
1 TABLET ORAL
Qty: 3 EACH | Refills: 5 | Status: SHIPPED | OUTPATIENT
Start: 2023-07-17

## 2023-07-17 NOTE — PROGRESS NOTES
Assessment/Plan:    NANCY (obstructive sleep apnea)  Frank Swift" has had snoring, interrupted sleep and waking up not refreshed. He occasionally felt sleepy and tired during the day. He had diabetes as comorbidity. On clinical examination, his chest was clear to auscultation. He had oropharyngeal crowding Mallampati class III. His [place] sleepiness score is 9 out of 24. His overnight home sleep study on 5/25/2023 showed moderate obstructive sleep apnea with significant oxygen desaturation. He was subsequently started on auto CPAP 5 to 15 cm of water using nasal pillows. He has used it for about a week and is having problems with the mask as well as pressure. He complained of leak and dry mouth. He stated that he could not tolerate the high pressure which wakes him up. I have changed him to fix-it CPAP 6 cm of water. His residual AHI was low. He wants to continue trying CPAP therapy. I had a long discussion with him and I counseled him regarding sleep hygiene. I answered all his questions    Asthma  He has previous history of asthma. He was previously using inhaler. He has history of allergies. Currently his exercise tolerance is unrestricted and does not have any significant cough or phlegm or wheeze or chest pain. His chest was clear to auscultation       Diagnoses and all orders for this visit:    NANCY (obstructive sleep apnea)  -     PAP DME Pressure Change    Other orders  -     saxagliptin (Onglyza) 2.5 MG tablet          Subjective:      Patient ID: Lesly Connolly is a 65 y.o. male. Art Herd in for follow-up for his obstructive sleep apnea recently started on auto CPAP therapy 5 to 15 cm of water. He used it for a week and has been having problems with the mask and pressure. He wakes up frequently and his mouth gets very dry.   He stated that initially he is fine with the pressure but later on he wakes up and finds it difficult to breathe because of the high pressure. He also has leak. He uses nasal pillows. Sleep has been disturbed and he did not use it yesterday. He denied any headache. I reviewed his CPAP compliance records for 1 week. He is 95th percentile pressure was 7.8 and his median pressure was 5.8. We will change him to his fix-it CPAP pressure of 6 cm of water and make further adjustments as needed. We will plan to send him for a mask fit if he continues to have problems with      The following portions of the patient's history were reviewed and updated as appropriate: allergies, current medications, past family history, past medical history, past social history, past surgical history and problem list.    Review of Systems   Constitutional: Negative for activity change, appetite change, chills, fatigue and fever. HENT: Negative for hearing loss, rhinorrhea, sneezing and trouble swallowing. Eyes: Negative for visual disturbance. Respiratory: Negative for cough, chest tightness, shortness of breath and wheezing. Cardiovascular: Negative for chest pain, palpitations and leg swelling. Gastrointestinal: Negative for abdominal pain, constipation, diarrhea, nausea and vomiting. Genitourinary: Negative for dysuria, frequency and urgency. Musculoskeletal: Negative for arthralgias, gait problem and joint swelling. Skin: Negative for rash. Allergic/Immunologic: Negative for environmental allergies. Neurological: Negative for dizziness, syncope, light-headedness and headaches. Psychiatric/Behavioral: Positive for sleep disturbance. Negative for agitation and confusion. The patient is not nervous/anxious. Objective:      /78   Pulse 84   Temp 97.9 °F (36.6 °C)   Ht 5' 4" (1.626 m)   Wt 61.2 kg (135 lb)   SpO2 98%   BMI 23.17 kg/m²          Physical Exam  Vitals reviewed. Constitutional:       General: He is not in acute distress. Appearance: He is not ill-appearing, toxic-appearing or diaphoretic.    HENT: Head: Normocephalic. Mouth/Throat:      Mouth: Mucous membranes are moist.      Pharynx: Oropharynx is clear. Eyes:      General: No scleral icterus. Right eye: No discharge. Conjunctiva/sclera: Conjunctivae normal.   Cardiovascular:      Rate and Rhythm: Normal rate and regular rhythm. Heart sounds: Normal heart sounds. No murmur heard. Pulmonary:      Effort: Pulmonary effort is normal. No respiratory distress. Breath sounds: Normal breath sounds. No stridor. No wheezing, rhonchi or rales. Chest:      Chest wall: No tenderness. Abdominal:      General: Bowel sounds are normal.      Palpations: Abdomen is soft. Tenderness: There is no abdominal tenderness. There is no guarding. Musculoskeletal:      Cervical back: No rigidity. Right lower leg: No edema. Left lower leg: No edema. Lymphadenopathy:      Cervical: No cervical adenopathy. Skin:     Coloration: Skin is not jaundiced or pale. Findings: No rash. Neurological:      Mental Status: He is alert and oriented to person, place, and time. Gait: Gait normal.   Psychiatric:         Mood and Affect: Mood normal.         Behavior: Behavior normal.         Thought Content:  Thought content normal.         Judgment: Judgment normal.

## 2023-07-18 NOTE — ASSESSMENT & PLAN NOTE
Chilango Guerrero "Bell Butler" has had snoring, interrupted sleep and waking up not refreshed. He occasionally felt sleepy and tired during the day. He had diabetes as comorbidity. On clinical examination, his chest was clear to auscultation. He had oropharyngeal crowding Mallampati class III. His [place] sleepiness score is 9 out of 24. His overnight home sleep study on 5/25/2023 showed moderate obstructive sleep apnea with significant oxygen desaturation. He was subsequently started on auto CPAP 5 to 15 cm of water using nasal pillows. He has used it for about a week and is having problems with the mask as well as pressure. He complained of leak and dry mouth. He stated that he could not tolerate the high pressure which wakes him up. I have changed him to fix-it CPAP 6 cm of water. His residual AHI was low. He wants to continue trying CPAP therapy. I had a long discussion with him and I counseled him regarding sleep hygiene.  I answered all his questions

## 2023-07-18 NOTE — ASSESSMENT & PLAN NOTE
He has previous history of asthma. He was previously using inhaler. He has history of allergies. Currently his exercise tolerance is unrestricted and does not have any significant cough or phlegm or wheeze or chest pain.   His chest was clear to auscultation

## 2023-07-20 LAB
DME PARACHUTE DELIVERY DATE REQUESTED: NORMAL
DME PARACHUTE ITEM DESCRIPTION: NORMAL
DME PARACHUTE ORDER STATUS: NORMAL
DME PARACHUTE SUPPLIER NAME: NORMAL
DME PARACHUTE SUPPLIER PHONE: NORMAL

## 2023-07-28 ENCOUNTER — OFFICE VISIT (OUTPATIENT)
Dept: ENDOCRINOLOGY | Facility: CLINIC | Age: 58
End: 2023-07-28
Payer: COMMERCIAL

## 2023-07-28 ENCOUNTER — TELEPHONE (OUTPATIENT)
Dept: ENDOCRINOLOGY | Facility: CLINIC | Age: 58
End: 2023-07-28

## 2023-07-28 VITALS
SYSTOLIC BLOOD PRESSURE: 118 MMHG | HEART RATE: 99 BPM | HEIGHT: 64 IN | DIASTOLIC BLOOD PRESSURE: 80 MMHG | WEIGHT: 130 LBS | BODY MASS INDEX: 22.2 KG/M2 | TEMPERATURE: 98.8 F | OXYGEN SATURATION: 98 %

## 2023-07-28 DIAGNOSIS — E11.65 TYPE 2 DIABETES MELLITUS WITH HYPERGLYCEMIA, WITHOUT LONG-TERM CURRENT USE OF INSULIN (HCC): ICD-10-CM

## 2023-07-28 DIAGNOSIS — E55.9 VITAMIN D DEFICIENCY: ICD-10-CM

## 2023-07-28 DIAGNOSIS — E11.65 TYPE 2 DIABETES MELLITUS WITH HYPERGLYCEMIA, WITHOUT LONG-TERM CURRENT USE OF INSULIN (HCC): Primary | ICD-10-CM

## 2023-07-28 PROCEDURE — 99214 OFFICE O/P EST MOD 30 MIN: CPT | Performed by: INTERNAL MEDICINE

## 2023-07-28 RX ORDER — METFORMIN HYDROCHLORIDE 500 MG/1
500 TABLET, EXTENDED RELEASE ORAL 2 TIMES DAILY WITH MEALS
Qty: 180 TABLET | Refills: 0 | Status: SHIPPED | OUTPATIENT
Start: 2023-07-28

## 2023-07-28 RX ORDER — METFORMIN HYDROCHLORIDE 500 MG/1
500 TABLET, EXTENDED RELEASE ORAL 2 TIMES DAILY WITH MEALS
Qty: 180 TABLET | Refills: 0 | Status: SHIPPED | OUTPATIENT
Start: 2023-07-28 | End: 2023-07-28 | Stop reason: SDUPTHER

## 2023-07-28 NOTE — TELEPHONE ENCOUNTER
Pt left v/m saying his pharmacy told him to have his Trulicity and metformin sent to [address] [facility] at [facility] instead.

## 2023-07-28 NOTE — PROGRESS NOTES
Follow-up Patient Progress Note      CC: Type 2 diabetes     History of Present Illness:   60-year-old male with type 2 diabetes >10 yrs, HTN, HLD, retinopathy, microalbuminuria, fatigue and vitamin D deficiency. Last visit was 4/28/23.     Since last visit he lost 6 lbs. Reports waking up tired sometimes. Continues to have fatigue.       CGM data review[de-identified]  Device: dexcom Dates:7/20/23 - 7/28/23  Usage: 100 %  Av glu: 171 mg/dL  SD: 44 mg/dL CV:   % GMI:   %  TIR: 63 %  TAR: 31+6 %  TBR:   %    Glycemic patters:  Normal fasting and significant postprandial hyperglycemia. Hypoglycemia: No     Current meds:  Janumet 50-500mg BID     Opthamology: yes - retinopathy  Podiatry:   vaccination: yes  Dental:  Pancreatitis: No     Ace/ARB: No  Statin: No  Thyroid issues: No     FH: mother - diabetes    Patient Active Problem List   Diagnosis   • Asthma   • Type 2 diabetes mellitus with hyperglycemia, without long-term current use of insulin (HCC)   • NANCY (obstructive sleep apnea)     Past Medical History:   Diagnosis Date   • Diabetes mellitus ([address])       Past Surgical History:   Procedure Laterality Date   • CATARACT EXTRACTION     • SKIN CANCER EXCISION Right 10/2020    skin granuloma      History reviewed. No pertinent family history. Social History     Tobacco Use   • Smoking status: Never   • Smokeless tobacco: Never   Substance Use Topics   • Alcohol use: Never     No Known Allergies      Current Outpatient Medications:   •  Continuous Blood Gluc Sensor (Dexcom G7 Sensor), Use 1 Device every 10 days, Disp: 3 each, Rfl: 5  •  dulaglutide (Trulicity) 6.03 YR/6.7KX injection, Inject 0.5 mL (0.75 mg total) under the skin once a week, Disp: 2 mL, Rfl: 0  •  metFORMIN (GLUCOPHAGE-XR) 500 mg 24 hr tablet, Take 1 tablet (500 mg total) by mouth 2 (two) times a day with meals, Disp: 180 tablet, Rfl: 0    Review of Systems   Constitutional: Positive for activity change and appetite change. HENT: Negative. Eyes: Negative. Respiratory: Negative. Cardiovascular: Negative for chest pain. Gastrointestinal: Negative. Endocrine: Negative. Genitourinary: Negative. Musculoskeletal: Negative. Skin: Negative. Allergic/Immunologic: Negative. Neurological: Negative. Hematological: Negative. Psychiatric/Behavioral: Negative. All other systems reviewed and are negative. Physical Exam:  Body mass index is 22.31 kg/m². /80 (BP Location: Right arm, Patient Position: Sitting, Cuff Size: Large)   Pulse 99   Temp 98.8 °F (37.1 °C) (Tympanic)   Ht 5' 4" (1.626 m)   Wt 59 kg (130 lb)   SpO2 98%   BMI 22.31 kg/m²    Vitals:    07/28/23 1100   Weight: 59 kg (130 lb)        Physical Exam  Vitals reviewed. HENT:      Head: Normocephalic. Eyes:      Pupils: Pupils are equal, round, and reactive to light. Musculoskeletal:         General: No deformity. Neurological:      Mental Status: He is alert and oriented to person, place, and time.          Labs:   Lab Results   Component Value Date    HGBA1C 6.8 (H) 05/01/2023       Lab Results   Component Value Date    WUZ0RASWSCQU 2.165 04/25/2023       Lab Results   Component Value Date    CREATININE 1.11 05/01/2023    CREATININE 1.10 07/26/2022    CREATININE 1.03 11/13/2021    BUN 16 05/01/2023    K 4.2 05/01/2023     05/01/2023    CO2 29 05/01/2023     eGFR   Date Value Ref Range Status   05/01/2023 73 ml/min/1.73sq m Final       Lab Results   Component Value Date    ALT 26 05/01/2023    AST 18 05/01/2023    ALKPHOS 35 05/01/2023       Lab Results   Component Value Date    CHOLESTEROL 218 (H) 04/25/2023    CHOLESTEROL 207 (H) 07/26/2022    CHOLESTEROL 194 11/13/2021     Lab Results   Component Value Date    HDL 45 04/25/2023    HDL 44 07/26/2022    HDL 43 11/13/2021     Lab Results   Component Value Date    TRIG 371 (H) 04/25/2023    TRIG 213 (H) 07/26/2022    TRIG 180 (H) 11/13/2021     Lab Results   Component Value Date    [email or web address] 04/25/2023 [address] 07/26/2022    [address] 151 11/13/2021         Impression:  1. Type 2 diabetes mellitus with hyperglycemia, without long-term current use of insulin ([address])    2. Vitamin D deficiency         Plan:    Diagnoses and all orders for this visit:    Type 2 diabetes mellitus with hyperglycemia, without long-term current use of insulin ([address]). He is controlled with A1c 6.8% and TIR 63% over last 2 weeks AGP. Most of his hyperglycemia is postprandial.  Today we discussed options and agreed to switch to regimen as listed below. In future, will consider increasing GLP1 agonist and follow up in 3 months. -     metFORMIN (GLUCOPHAGE-XR) 500 mg 24 hr tablet; Take 1 tablet (500 mg total) by mouth 2 (two) times a day with meals  -     dulaglutide (Trulicity) 1.68 ZY/6.1JY injection; Inject 0.5 mL (0.75 mg total) under the skin once a week    Vitamin D deficiency. Advised vit D3 2000IU daily OTC. I have spent 32 minutes with patient today in which greater than 50% of this time was spent in counseling/coordination of care. Discussed with the patient and all questioned fully answered. He will call me if any problems arise. Educated/ Counseled patient on diagnostic test results, prognosis, risk vs benefit of treatment options, importance of treatment compliance, healthy life and lifestyle choices.       [facility]

## 2023-08-14 ENCOUNTER — TELEPHONE (OUTPATIENT)
Dept: SLEEP CENTER | Facility: CLINIC | Age: 58
End: 2023-08-14

## 2023-09-01 DIAGNOSIS — E11.65 TYPE 2 DIABETES MELLITUS WITH HYPERGLYCEMIA, WITHOUT LONG-TERM CURRENT USE OF INSULIN (HCC): ICD-10-CM

## 2023-09-07 ENCOUNTER — TELEPHONE (OUTPATIENT)
Dept: ENDOCRINOLOGY | Facility: CLINIC | Age: 58
End: 2023-09-07

## 2023-09-07 DIAGNOSIS — E11.65 TYPE 2 DIABETES MELLITUS WITH HYPERGLYCEMIA, WITHOUT LONG-TERM CURRENT USE OF INSULIN (HCC): ICD-10-CM

## 2023-09-07 NOTE — TELEPHONE ENCOUNTER
Patient would like for you to review his Dexcom report. He has been having some high blood sugars.  In media

## 2023-09-08 ENCOUNTER — TREATMENT (OUTPATIENT)
Dept: OTHER | Facility: HOSPITAL | Age: 58
End: 2023-09-08
Payer: COMMERCIAL

## 2023-09-08 DIAGNOSIS — E11.65 TYPE 2 DIABETES MELLITUS WITH HYPERGLYCEMIA, WITHOUT LONG-TERM CURRENT USE OF INSULIN (HCC): Primary | ICD-10-CM

## 2023-09-08 PROCEDURE — 95251 CONT GLUC MNTR ANALYSIS I&R: CPT | Performed by: INTERNAL MEDICINE

## 2023-09-08 RX ORDER — DULAGLUTIDE 1.5 MG/.5ML
1.5 INJECTION, SOLUTION SUBCUTANEOUS WEEKLY
Qty: 6 ML | Refills: 0 | Status: SHIPPED | OUTPATIENT
Start: 2023-09-08 | End: 2023-09-11 | Stop reason: SDUPTHER

## 2023-09-08 RX ORDER — GLIMEPIRIDE 1 MG/1
1 TABLET ORAL
Qty: 30 TABLET | Refills: 0 | Status: SHIPPED | OUTPATIENT
Start: 2023-09-08

## 2023-09-08 NOTE — PROGRESS NOTES
CGM data review[de-identified]  Device: dexcom Dates: 8/25/2023-9/7/2023 usage: 93% Av glu: 169 mg/dL  SD: 39 mg/dL CV:   % GMI: 7.4%  TIR: 70%  TAR: 26 + 4%  TBR: 0%    Glycemic patters: Mild fasting hyperglycemia with significant postprandial hyperglycemia up to 220 mg/dL range. Hypoglycemia: No    Recommendation: Agree both fasting and after meal numbers are slightly high but not too bad. Your time in range was 70% and time above range was 30% which is acceptable. I however wish to bring the glucose levels further down.   Lets try the following:  Continue metformin 500 mg p.o. twice daily  Increase Trulicity 1.5 mg weekly  Add glimepiride 1 mg daily with breakfast

## 2023-09-11 DIAGNOSIS — E11.65 TYPE 2 DIABETES MELLITUS WITH HYPERGLYCEMIA, WITHOUT LONG-TERM CURRENT USE OF INSULIN (HCC): ICD-10-CM

## 2023-09-11 RX ORDER — DULAGLUTIDE 1.5 MG/.5ML
1.5 INJECTION, SOLUTION SUBCUTANEOUS WEEKLY
Qty: 6 ML | Refills: 0 | Status: SHIPPED | OUTPATIENT
Start: 2023-09-11

## 2023-10-11 DIAGNOSIS — E11.65 TYPE 2 DIABETES MELLITUS WITH HYPERGLYCEMIA, WITHOUT LONG-TERM CURRENT USE OF INSULIN (HCC): ICD-10-CM

## 2023-10-11 RX ORDER — DULAGLUTIDE 1.5 MG/.5ML
1.5 INJECTION, SOLUTION SUBCUTANEOUS WEEKLY
Qty: 6 ML | Refills: 0 | Status: SHIPPED | OUTPATIENT
Start: 2023-10-11 | End: 2023-10-13 | Stop reason: SDUPTHER

## 2023-10-13 DIAGNOSIS — E11.65 TYPE 2 DIABETES MELLITUS WITH HYPERGLYCEMIA, WITHOUT LONG-TERM CURRENT USE OF INSULIN (HCC): ICD-10-CM

## 2023-10-13 RX ORDER — DULAGLUTIDE 1.5 MG/.5ML
1.5 INJECTION, SOLUTION SUBCUTANEOUS WEEKLY
Qty: 6 ML | Refills: 1 | Status: SHIPPED | OUTPATIENT
Start: 2023-10-13

## 2023-10-13 RX ORDER — GLIMEPIRIDE 1 MG/1
1 TABLET ORAL
Qty: 90 TABLET | Refills: 1 | Status: SHIPPED | OUTPATIENT
Start: 2023-10-13

## 2023-10-23 ENCOUNTER — OFFICE VISIT (OUTPATIENT)
Dept: URGENT CARE | Facility: CLINIC | Age: 58
End: 2023-10-23
Payer: COMMERCIAL

## 2023-10-23 VITALS
WEIGHT: 132 LBS | OXYGEN SATURATION: 98 % | HEART RATE: 95 BPM | TEMPERATURE: 98.2 F | BODY MASS INDEX: 22.66 KG/M2 | RESPIRATION RATE: 12 BRPM

## 2023-10-23 DIAGNOSIS — J06.9 VIRAL URI WITH COUGH: ICD-10-CM

## 2023-10-23 DIAGNOSIS — H10.9 BACTERIAL CONJUNCTIVITIS OF LEFT EYE: Primary | ICD-10-CM

## 2023-10-23 PROCEDURE — 99213 OFFICE O/P EST LOW 20 MIN: CPT | Performed by: STUDENT IN AN ORGANIZED HEALTH CARE EDUCATION/TRAINING PROGRAM

## 2023-10-23 RX ORDER — ERGOCALCIFEROL 1.25 MG/1
CAPSULE ORAL
COMMUNITY
Start: 2023-08-28

## 2023-10-23 RX ORDER — FLUTICASONE PROPIONATE 50 MCG
1 SPRAY, SUSPENSION (ML) NASAL 2 TIMES DAILY
Qty: 11.1 ML | Refills: 0 | Status: SHIPPED | OUTPATIENT
Start: 2023-10-23

## 2023-10-23 RX ORDER — BENZONATATE 100 MG/1
100 CAPSULE ORAL 3 TIMES DAILY PRN
Qty: 20 CAPSULE | Refills: 0 | Status: SHIPPED | OUTPATIENT
Start: 2023-10-23

## 2023-10-23 RX ORDER — POLYMYXIN B SULFATE AND TRIMETHOPRIM 1; 10000 MG/ML; [USP'U]/ML
1 SOLUTION OPHTHALMIC EVERY 4 HOURS
Qty: 10 ML | Refills: 0 | Status: SHIPPED | OUTPATIENT
Start: 2023-10-23

## 2023-10-23 NOTE — PROGRESS NOTES
[facility]        NAME: Chago Bryant is a 65 y.o. male  : 1965    MRN: [id number]  DATE: 2023  TIME: 7:23 PM    Assessment and Orders   Bacterial conjunctivitis of left eye [H10.9]  1. Bacterial conjunctivitis of left eye  polymyxin b-trimethoprim (POLYTRIM) ophthalmic solution      2. Viral URI with cough  benzonatate (TESSALON PERLES) 100 mg capsule    fluticasone (FLONASE) 50 mcg/act nasal spray            Plan and Discussion      Symptoms and exam consistent with viral URI. Will treat with tessalon perles and Flonase for symptoms. Patient's conjunctivitis is also likely viral in  nature, however, given the significant amount of discharge, will cover for bacterial infection with topical antibiotics. Discussed ED precautions including (but not limited to)  Difficultly breathing or shortness of breath  Chest pain  Acutely worsening symptoms. Risks and benefits discussed. Patient understands and agrees with the plan. Follow up with PCP. Chief Complaint     Chief Complaint   Patient presents with    Cold Like Symptoms     Pt presents with runny nose, pnd, scratchy throat, cough, hoarse voice; started Saturday; Covid negative; left eye redness, discharge started last night         History of Present Illness       URI   This is a new problem. The current episode started in the past 7 days. There has been no fever. Associated symptoms include congestion, coughing and a sore throat (scratchy). Pertinent negatives include no abdominal pain, ear pain, headaches or vomiting. Conjunctivitis   The current episode started today. The onset was gradual. The problem has been gradually worsening. Associated symptoms include congestion, sore throat (scratchy), cough, URI, eye discharge, eye pain and eye redness. Pertinent negatives include no decreased vision, no double vision, no photophobia, no abdominal pain, no vomiting, no ear pain and no headaches.        Review of Systems Review of Systems   HENT:  Positive for congestion and sore throat (scratchy). Negative for ear pain. Eyes:  Positive for pain, discharge and redness. Negative for double vision and photophobia. Respiratory:  Positive for cough. Gastrointestinal:  Negative for abdominal pain and vomiting. Neurological:  Negative for headaches. Current Medications       Current Outpatient Medications:     benzonatate (TESSALON PERLES) 100 mg capsule, Take 1 capsule (100 mg total) by mouth 3 (three) times a day as needed for cough, Disp: 20 capsule, Rfl: 0    Continuous Blood Gluc Sensor (Dexcom G7 Sensor), Use 1 Device every 10 days, Disp: 3 each, Rfl: 5    dulaglutide (Trulicity) 1.5 GR/4.8SU injection, Inject 0.5 mL (1.5 mg total) under the skin once a week, Disp: 6 mL, Rfl: 1    ergocalciferol (VITAMIN D2) 50,000 units, , Disp: , Rfl:     fluticasone (FLONASE) 50 mcg/act nasal spray, 1 spray into each nostril 2 (two) times a day, Disp: 11.1 mL, Rfl: 0    glimepiride (AMARYL) 1 mg tablet, Take 1 tablet (1 mg total) by mouth daily with breakfast, Disp: 90 tablet, Rfl: 1    metFORMIN (GLUCOPHAGE-XR) 500 mg 24 hr tablet, Take 1 tablet (500 mg total) by mouth 2 (two) times a day with meals, Disp: 180 tablet, Rfl: 0    polymyxin b-trimethoprim (POLYTRIM) ophthalmic solution, Administer 1 drop into the left eye every 4 (four) hours, Disp: 10 mL, Rfl: 0    Current Allergies     Allergies as of 10/23/2023    (No Known Allergies)            The following portions of the patient's history were reviewed and updated as appropriate: allergies, current medications, past family history, past medical history, past social history, past surgical history and problem list.     Past Medical History:   Diagnosis Date    Asthma     Diabetes mellitus ([address])        Past Surgical History:   Procedure Laterality Date    CATARACT EXTRACTION      SKIN CANCER EXCISION Right 10/2020    skin granuloma       History reviewed.  No pertinent family history. Medications have been verified. Objective   Pulse 95   Temp 98.2 °F (36.8 °C)   Resp 12   Wt 59.9 kg (132 lb)   SpO2 98%   BMI 22.66 kg/m²   No LMP for male patient. Physical Exam     Physical Exam  Constitutional:       General: He is not in acute distress. Appearance: He is not ill-appearing or toxic-appearing. HENT:      Head: Normocephalic and atraumatic. Right Ear: Tympanic membrane and external ear normal.      Left Ear: Tympanic membrane and external ear normal.      Nose: Congestion present. Comments: Boggy nasal turbinates     Mouth/Throat:      Pharynx: Posterior oropharyngeal erythema present. Comments: Cobblestone appearance in posterior pharynx  Eyes:      General:         Left eye: Discharge present. Extraocular Movements:      Left eye: Normal extraocular motion. Conjunctiva/sclera:      Left eye: Left conjunctiva is injected. Cardiovascular:      Rate and Rhythm: Normal rate and regular rhythm. Pulmonary:      Effort: Pulmonary effort is normal. No respiratory distress. Breath sounds: No wheezing. Neurological:      General: No focal deficit present. Mental Status: He is alert and oriented to person, place, and time. Psychiatric:         Mood and Affect: Mood normal.         Behavior: Behavior normal.         Thought Content:  Thought content normal.         Judgment: Judgment normal.               [facility]

## 2023-10-27 ENCOUNTER — OFFICE VISIT (OUTPATIENT)
Dept: ENDOCRINOLOGY | Facility: CLINIC | Age: 58
End: 2023-10-27
Payer: COMMERCIAL

## 2023-10-27 VITALS
HEIGHT: 64 IN | SYSTOLIC BLOOD PRESSURE: 122 MMHG | DIASTOLIC BLOOD PRESSURE: 76 MMHG | HEART RATE: 92 BPM | OXYGEN SATURATION: 99 % | BODY MASS INDEX: 22.36 KG/M2 | WEIGHT: 131 LBS | TEMPERATURE: 98.9 F | RESPIRATION RATE: 19 BRPM

## 2023-10-27 DIAGNOSIS — E11.65 TYPE 2 DIABETES MELLITUS WITH HYPERGLYCEMIA, WITHOUT LONG-TERM CURRENT USE OF INSULIN (HCC): Primary | ICD-10-CM

## 2023-10-27 DIAGNOSIS — G47.33 OSA (OBSTRUCTIVE SLEEP APNEA): ICD-10-CM

## 2023-10-27 DIAGNOSIS — E55.9 VITAMIN D DEFICIENCY: ICD-10-CM

## 2023-10-27 LAB — SL AMB POCT HEMOGLOBIN AIC: 6.3 (ref ?–6.5)

## 2023-10-27 PROCEDURE — 95251 CONT GLUC MNTR ANALYSIS I&R: CPT | Performed by: INTERNAL MEDICINE

## 2023-10-27 PROCEDURE — 99214 OFFICE O/P EST MOD 30 MIN: CPT | Performed by: INTERNAL MEDICINE

## 2023-10-27 PROCEDURE — 83036 HEMOGLOBIN GLYCOSYLATED A1C: CPT | Performed by: INTERNAL MEDICINE

## 2023-10-27 RX ORDER — DULAGLUTIDE 1.5 MG/.5ML
1.5 INJECTION, SOLUTION SUBCUTANEOUS WEEKLY
Qty: 6 ML | Refills: 1 | Status: SHIPPED | OUTPATIENT
Start: 2023-10-27

## 2023-10-27 RX ORDER — GLIMEPIRIDE 1 MG/1
1 TABLET ORAL
Qty: 90 TABLET | Refills: 1 | Status: SHIPPED | OUTPATIENT
Start: 2023-10-27

## 2023-10-27 RX ORDER — METFORMIN HYDROCHLORIDE 500 MG/1
500 TABLET, EXTENDED RELEASE ORAL 2 TIMES DAILY WITH MEALS
Qty: 180 TABLET | Refills: 1 | Status: SHIPPED | OUTPATIENT
Start: 2023-10-27

## 2023-10-27 NOTE — PROGRESS NOTES
Follow-up Patient Progress Note      CC: Type 2 diabetes     History of Present Illness:   45-year-old male with type 2 diabetes >10 yrs, HTN, HLD, retinopathy, microalbuminuria, fatigue and vitamin D deficiency. Last visit was 4/28/23. Since last visit he lost 6 lbs. he reports overall improvement since using glimepiride. CGM data review[de-identified]  Device: dexcom          Dates:10/14/23 - 10/27/23                      Usage: 86 %             Av glu: 163 mg/dL                  SD: 43 mg/dL  CV:   %            GMI:   %  TIR: 71 %                    TAR: 25+4 %               TBR:   %     Glycemic patters:  Normal fasting and significant postprandial hyperglycemia. Hypoglycemia: No     Current meds:  metformin 500 mg p.o. twice daily  Trulicity 1.5 mg weekly  Glimepiride 1 mg daily with breakfast     Opthamology: yes - retinopathy  Podiatry:   vaccination: yes  Dental:  Pancreatitis: No     Ace/ARB: No  Statin: No  Thyroid issues: No    Patient Active Problem List   Diagnosis    Asthma    Type 2 diabetes mellitus with hyperglycemia, without long-term current use of insulin ([facility])    NANCY (obstructive sleep apnea)     Past Medical History:   Diagnosis Date    Asthma     Diabetes mellitus ([address])       Past Surgical History:   Procedure Laterality Date    CATARACT EXTRACTION      SKIN CANCER EXCISION Right 10/2020    skin granuloma      History reviewed. No pertinent family history.   Social History     Tobacco Use    Smoking status: Never     Passive exposure: Never    Smokeless tobacco: Never   Substance Use Topics    Alcohol use: Never     No Known Allergies      Current Outpatient Medications:     benzonatate (TESSALON PERLES) 100 mg capsule, Take 1 capsule (100 mg total) by mouth 3 (three) times a day as needed for cough, Disp: 20 capsule, Rfl: 0    Continuous Blood Gluc Sensor (Dexcom G7 Sensor), Use 1 Device every 10 days, Disp: 3 each, Rfl: 5    dulaglutide (Trulicity) 1.5 YQ/5.2GG injection, Inject 0.5 mL (1.5 mg total) under the skin once a week, Disp: 6 mL, Rfl: 1    ergocalciferol (VITAMIN D2) 50,000 units, , Disp: , Rfl:     fluticasone (FLONASE) 50 mcg/act nasal spray, 1 spray into each nostril 2 (two) times a day, Disp: 11.1 mL, Rfl: 0    glimepiride (AMARYL) 1 mg tablet, Take 1 tablet (1 mg total) by mouth daily with breakfast, Disp: 90 tablet, Rfl: 1    metFORMIN (GLUCOPHAGE-XR) 500 mg 24 hr tablet, Take 1 tablet (500 mg total) by mouth 2 (two) times a day with meals, Disp: 180 tablet, Rfl: 0    polymyxin b-trimethoprim (POLYTRIM) ophthalmic solution, Administer 1 drop into the left eye every 4 (four) hours, Disp: 10 mL, Rfl: 0    Review of Systems   Constitutional:  Positive for activity change and appetite change. HENT: Negative. Eyes: Negative. Respiratory: Negative. Cardiovascular:  Negative for chest pain. Gastrointestinal: Negative. Endocrine: Negative. Genitourinary: Negative. Musculoskeletal: Negative. Skin: Negative. Allergic/Immunologic: Negative. Neurological: Negative. Hematological: Negative. Psychiatric/Behavioral: Negative. All other systems reviewed and are negative. Physical Exam:  Body mass index is 22.49 kg/m². /76 (BP Location: Left arm, Patient Position: Sitting, Cuff Size: Standard)   Pulse 92   Temp 98.9 °F (37.2 °C) (Tympanic)   Resp 19   Ht 5' 4" (1.626 m)   Wt 59.4 kg (131 lb)   SpO2 99%   BMI 22.49 kg/m²    Vitals:    10/27/23 1308   Weight: 59.4 kg (131 lb)        Physical Exam  Constitutional:       General: He is not in acute distress. Appearance: He is well-developed. He is not ill-appearing. HENT:      Head: Normocephalic and atraumatic. Nose: Nose normal.      Mouth/Throat:      Pharynx: Oropharynx is clear. Eyes:      Extraocular Movements: Extraocular movements intact. Conjunctiva/sclera: Conjunctivae normal.   Neck:      Thyroid: No thyromegaly. Cardiovascular:      Rate and Rhythm: Normal rate. Pulmonary:      Effort: Pulmonary effort is normal.   Musculoskeletal:         General: No deformity. Cervical back: Normal range of motion. Skin:     Capillary Refill: Capillary refill takes less than 2 seconds. Coloration: Skin is not pale. Findings: No rash. Neurological:      Mental Status: He is alert and oriented to person, place, and time. Psychiatric:         Behavior: Behavior normal.         Labs:   Lab Results   Component Value Date    HGBA1C 6.3 10/27/2023       Lab Results   Component Value Date    [id number] 2.165 04/25/2023       Lab Results   Component Value Date    CREATININE 1.11 05/01/2023    CREATININE 1.10 07/26/2022    CREATININE 1.03 11/13/2021    BUN 16 05/01/2023    K 4.2 05/01/2023     05/01/2023    CO2 29 05/01/2023     eGFR   Date Value Ref Range Status   05/01/2023 73 ml/min/1.73sq m Final       Lab Results   Component Value Date    ALT 26 05/01/2023    AST 18 05/01/2023    ALKPHOS 35 05/01/2023       Lab Results   Component Value Date    CHOLESTEROL 218 (H) 04/25/2023    CHOLESTEROL 207 (H) 07/26/2022    CHOLESTEROL 194 11/13/2021     Lab Results   Component Value Date    HDL 45 04/25/2023    HDL 44 07/26/2022    HDL 43 11/13/2021     Lab Results   Component Value Date    TRIG 371 (H) 04/25/2023    TRIG 213 (H) 07/26/2022    TRIG 180 (H) 11/13/2021     Lab Results   Component Value Date    [address] 173 04/25/2023    [address] 163 07/26/2022    [address] 151 11/13/2021         Impression:  1. Type 2 diabetes mellitus with hyperglycemia, without long-term current use of insulin ([address])    2. NANCY (obstructive sleep apnea)    3. Vitamin D deficiency         Plan:    Aníbal Freeman was seen today for diabetes type 2. Diagnoses and all orders for this visit:    Type 2 diabetes mellitus with hyperglycemia, without long-term current use of insulin ([address] He is controlled with A1c 6.3%.     Today we discussed options and agreed to continue current regimen as listed below.  Suggested use of personal cgm intermittently if there is a cost issue. Follow up in 4 months. -     glimepiride (AMARYL) 1 mg tablet; Take 1 tablet (1 mg total) by mouth daily with breakfast  -     dulaglutide (Trulicity) 1.5 RM/9.6UK injection; Inject 0.5 mL (1.5 mg total) under the skin once a week  -     metFORMIN (GLUCOPHAGE-XR) 500 mg 24 hr tablet; Take 1 tablet (500 mg total) by mouth 2 (two) times a day with meals  -     Hemoglobin A1C; Future  -     Albumin / creatinine urine ratio; Future  -     POCT hemoglobin A1c    NANCY (obstructive sleep apnea). Vitamin D deficiency. Take vit D3 5000IU daily OTC. I have spent 32 minutes with patient today in which greater than 50% of this time was spent in counseling/coordination of care. Discussed with the patient and all questioned fully answered. He will call me if any problems arise. Educated/ Counseled patient on diagnostic test results, prognosis, risk vs benefit of treatment options, importance of treatment compliance, healthy life and lifestyle choices.       [facility] Gate[facility]eline

## 2024-02-21 ENCOUNTER — APPOINTMENT (OUTPATIENT)
Dept: LAB | Facility: HOSPITAL | Age: 59
End: 2024-02-21
Payer: COMMERCIAL

## 2024-02-21 DIAGNOSIS — E78.2 MIXED HYPERLIPIDEMIA: ICD-10-CM

## 2024-02-21 DIAGNOSIS — R35.0 URINARY FREQUENCY: ICD-10-CM

## 2024-02-21 DIAGNOSIS — E11.65 TYPE 2 DIABETES MELLITUS WITH HYPERGLYCEMIA, WITHOUT LONG-TERM CURRENT USE OF INSULIN (HCC): ICD-10-CM

## 2024-02-21 DIAGNOSIS — E11.69 TYPE 2 DIABETES MELLITUS WITH OTHER SPECIFIED COMPLICATION, UNSPECIFIED WHETHER LONG TERM INSULIN USE (HCC): ICD-10-CM

## 2024-02-21 LAB
ALBUMIN SERPL BCP-MCNC: 4.5 G/DL (ref 3.5–5)
ALP SERPL-CCNC: 31 U/L (ref 34–104)
ALT SERPL W P-5'-P-CCNC: 47 U/L (ref 7–52)
ANION GAP SERPL CALCULATED.3IONS-SCNC: 7 MMOL/L
AST SERPL W P-5'-P-CCNC: 29 U/L (ref 13–39)
BACTERIA UR QL AUTO: ABNORMAL /HPF
BILIRUB SERPL-MCNC: 0.55 MG/DL (ref 0.2–1)
BILIRUB UR QL STRIP: NEGATIVE
BUN SERPL-MCNC: 17 MG/DL (ref 5–25)
CALCIUM SERPL-MCNC: 9.1 MG/DL (ref 8.4–10.2)
CHLORIDE SERPL-SCNC: 103 MMOL/L (ref 96–108)
CHOLEST SERPL-MCNC: 187 MG/DL
CLARITY UR: CLEAR
CO2 SERPL-SCNC: 28 MMOL/L (ref 21–32)
COLOR UR: YELLOW
CREAT SERPL-MCNC: 1.11 MG/DL (ref 0.6–1.3)
CREAT UR-MCNC: 166.3 MG/DL
EST. AVERAGE GLUCOSE BLD GHB EST-MCNC: 140 MG/DL
GFR SERPL CREATININE-BSD FRML MDRD: 73 ML/MIN/1.73SQ M
GLUCOSE P FAST SERPL-MCNC: 143 MG/DL (ref 65–99)
GLUCOSE UR STRIP-MCNC: NEGATIVE MG/DL
HBA1C MFR BLD: 6.5 %
HDLC SERPL-MCNC: 40 MG/DL
HGB UR QL STRIP.AUTO: NEGATIVE
KETONES UR STRIP-MCNC: NEGATIVE MG/DL
LDLC SERPL CALC-MCNC: 76 MG/DL (ref 0–100)
LEUKOCYTE ESTERASE UR QL STRIP: NEGATIVE
MICROALBUMIN UR-MCNC: 124.1 MG/L
MICROALBUMIN/CREAT 24H UR: 75 MG/G CREATININE (ref 0–30)
NITRITE UR QL STRIP: NEGATIVE
NON-SQ EPI CELLS URNS QL MICRO: ABNORMAL /HPF
NONHDLC SERPL-MCNC: 147 MG/DL
PH UR STRIP.AUTO: 6 [PH]
POTASSIUM SERPL-SCNC: 4.1 MMOL/L (ref 3.5–5.3)
PROT SERPL-MCNC: 7.1 G/DL (ref 6.4–8.4)
PROT UR STRIP-MCNC: ABNORMAL MG/DL
RBC #/AREA URNS AUTO: ABNORMAL /HPF
SODIUM SERPL-SCNC: 138 MMOL/L (ref 135–147)
SP GR UR STRIP.AUTO: >=1.03 (ref 1–1.03)
TRIGL SERPL-MCNC: 355 MG/DL
UROBILINOGEN UR QL STRIP.AUTO: 0.2 E.U./DL
WBC #/AREA URNS AUTO: ABNORMAL /HPF

## 2024-02-21 PROCEDURE — 80061 LIPID PANEL: CPT

## 2024-02-21 PROCEDURE — 82043 UR ALBUMIN QUANTITATIVE: CPT

## 2024-02-21 PROCEDURE — 83036 HEMOGLOBIN GLYCOSYLATED A1C: CPT

## 2024-02-21 PROCEDURE — 81001 URINALYSIS AUTO W/SCOPE: CPT

## 2024-02-21 PROCEDURE — 80053 COMPREHEN METABOLIC PANEL: CPT

## 2024-02-21 PROCEDURE — 82570 ASSAY OF URINE CREATININE: CPT

## 2024-02-21 PROCEDURE — 36415 COLL VENOUS BLD VENIPUNCTURE: CPT

## 2024-02-26 ENCOUNTER — OFFICE VISIT (OUTPATIENT)
Dept: ENDOCRINOLOGY | Facility: CLINIC | Age: 59
End: 2024-02-26
Payer: COMMERCIAL

## 2024-02-26 VITALS
OXYGEN SATURATION: 99 % | HEART RATE: 83 BPM | DIASTOLIC BLOOD PRESSURE: 88 MMHG | HEIGHT: 64 IN | RESPIRATION RATE: 16 BRPM | TEMPERATURE: 97.3 F | WEIGHT: 141 LBS | BODY MASS INDEX: 24.07 KG/M2 | SYSTOLIC BLOOD PRESSURE: 126 MMHG

## 2024-02-26 DIAGNOSIS — E55.9 VITAMIN D DEFICIENCY: ICD-10-CM

## 2024-02-26 DIAGNOSIS — E11.65 TYPE 2 DIABETES MELLITUS WITH HYPERGLYCEMIA, WITHOUT LONG-TERM CURRENT USE OF INSULIN (HCC): Primary | ICD-10-CM

## 2024-02-26 DIAGNOSIS — R80.9 MICROALBUMINURIA: ICD-10-CM

## 2024-02-26 PROCEDURE — 95251 CONT GLUC MNTR ANALYSIS I&R: CPT | Performed by: INTERNAL MEDICINE

## 2024-02-26 PROCEDURE — 99214 OFFICE O/P EST MOD 30 MIN: CPT | Performed by: INTERNAL MEDICINE

## 2024-02-26 RX ORDER — METFORMIN HYDROCHLORIDE 500 MG/1
500 TABLET, EXTENDED RELEASE ORAL
Qty: 270 TABLET | Refills: 1 | Status: SHIPPED | OUTPATIENT
Start: 2024-02-26

## 2024-02-26 RX ORDER — DULAGLUTIDE 3 MG/.5ML
3 INJECTION, SOLUTION SUBCUTANEOUS
Qty: 6 ML | Refills: 1 | Status: SHIPPED | OUTPATIENT
Start: 2024-02-26

## 2024-02-26 NOTE — ASSESSMENT & PLAN NOTE
He seems uncontrolled with pp hyperglycemia per AGP but not enough data. Note persistent microalbuminuria.    Trulicity was not available at local pharmacy.  Today we agreed to increase metformin 500 mg p.o. TID, increase Trulicity 3 mg weekly, continue glimepiride 1mg qdaily and add jardiance 10mg qdaily. Rx sent to mail order.  May consider waling 10min after each meal.    In future, will aim to STOP glimepiride.  Send dexcom data in 6 weeks.  Follow up in 3 months.    Lab Results   Component Value Date    HGBA1C 6.5 (H) 02/21/2024

## 2024-02-26 NOTE — PROGRESS NOTES
"  Follow-up Patient Progress Note      CC: Type 2 diabetes     History of Present Illness:   57-year-old male with type 2 diabetes >10 yrs, HTN, HLD, retinopathy, microalbuminuria, fatigue and vitamin D deficiency. Last visit was 10/27/24.     Since last visit he lost 6 lbs. he reports overall improvement since using glimepiride.     CGM data review::  Device: dexcom          Dates:2/20/24 - 2/23/24                      Usage: 70 %             Av glu: 189 mg/dL                  SD: 45 mg/dL  CV:   %            GMI:   %  TIR: 46 %                    TAR: 46+8%               TBR:   %     Glycemic patters:  pp spike after breakfast otherwise mild pp hyperglycemia and fasting normoglycemia.  Hypoglycemia: No     Current meds:  metformin 500 mg p.o. twice daily  Trulicity 1.5 mg weekly  Glimepiride 1 mg daily with breakfast     Opthamology: yes - retinopathy  Podiatry:   vaccination: yes  Dental:  Pancreatitis: No     Ace/ARB: No  Statin: No  Thyroid issues: No      Physical Exam:  Body mass index is 24.2 kg/m².  /88 (BP Location: Left arm, Patient Position: Sitting)   Pulse 83   Temp (!) 97.3 °F (36.3 °C) (Tympanic)   Resp 16   Ht 5' 4\" (1.626 m)   Wt 64 kg (141 lb)   SpO2 99%   BMI 24.20 kg/m²    Vitals:    02/26/24 1005   Weight: 64 kg (141 lb)        Physical Exam  Constitutional:       General: He is not in acute distress.     Appearance: He is well-developed. He is not ill-appearing.   HENT:      Head: Normocephalic and atraumatic.      Nose: Nose normal.      Mouth/Throat:      Pharynx: Oropharynx is clear.   Eyes:      Extraocular Movements: Extraocular movements intact.      Conjunctiva/sclera: Conjunctivae normal.   Neck:      Thyroid: No thyromegaly.   Cardiovascular:      Rate and Rhythm: Normal rate.   Pulmonary:      Effort: Pulmonary effort is normal.   Musculoskeletal:         General: No deformity.      Cervical back: Normal range of motion.   Skin:     Capillary Refill: Capillary refill " takes less than 2 seconds.      Coloration: Skin is not pale.      Findings: No rash.   Neurological:      Mental Status: He is alert and oriented to person, place, and time.   Psychiatric:         Behavior: Behavior normal.       Labs:   Lab Results   Component Value Date    HGBA1C 6.5 (H) 02/21/2024       Lab Results   Component Value Date    [id number] 2.165 04/25/2023       Lab Results   Component Value Date    CREATININE 1.11 02/21/2024    CREATININE 1.11 05/01/2023    CREATININE 1.10 07/26/2022    BUN 17 02/21/2024    K 4.1 02/21/2024     02/21/2024    CO2 28 02/21/2024     eGFR   Date Value Ref Range Status   02/21/2024 73 ml/min/1.73sq m Final       Lab Results   Component Value Date    ALT 47 02/21/2024    AST 29 02/21/2024    ALKPHOS 31 (L) 02/21/2024       Lab Results   Component Value Date    CHOLESTEROL 187 02/21/2024    CHOLESTEROL 218 (H) 04/25/2023    CHOLESTEROL 207 (H) 07/26/2022     Lab Results   Component Value Date    HDL 40 02/21/2024    HDL 45 04/25/2023    HDL 44 07/26/2022     Lab Results   Component Value Date    TRIG 355 (H) 02/21/2024    TRIG 371 (H) 04/25/2023    TRIG 213 (H) 07/26/2022     Lab Results   Component Value Date    NONHDLC 147 02/21/2024    NONHDLC 173 04/25/2023    NONHDLC 163 07/26/2022         Assessment/Plan:    Problem List Items Addressed This Visit          Endocrine    Type 2 diabetes mellitus with hyperglycemia, without long-term current use of insulin (HCC) - Primary     He seems uncontrolled with pp hyperglycemia per AGP but not enough data. Note persistent microalbuminuria.    Trulicity was not available at local pharmacy.  Today we agreed to increase metformin 500 mg p.o. TID, increase Trulicity 3 mg weekly, continue glimepiride 1mg qdaily and add jardiance 10mg qdaily. Rx sent to mail order.  May consider waling 10min after each meal.    In future, will aim to STOP glimepiride.  Send dexcom data in 6 weeks.  Follow up in 3 months.    Lab Results    Component Value Date    HGBA1C 6.5 (H) 02/21/2024            Relevant Medications    dulaglutide (Trulicity) 3 MG/0.5ML injection    Empagliflozin (Jardiance) 10 MG TABS tablet    metFORMIN (GLUCOPHAGE-XR) 500 mg 24 hr tablet       Other    Microalbuminuria    Vitamin D deficiency     Take vit D3 2000IU daily OTC.              I have spent a total time of 32 minutes on 02/26/24 in caring for this patient including greater than 50% of this time was spent in counseling/coordination of care as listed above.       Discussed with the patient and all questioned fully answered. He will contact me with concerns.    Sherley Espinoza

## 2024-03-06 ENCOUNTER — PATIENT MESSAGE (OUTPATIENT)
Dept: ENDOCRINOLOGY | Facility: CLINIC | Age: 59
End: 2024-03-06

## 2024-03-08 DIAGNOSIS — E11.65 TYPE 2 DIABETES MELLITUS WITH HYPERGLYCEMIA, WITHOUT LONG-TERM CURRENT USE OF INSULIN (HCC): ICD-10-CM

## 2024-03-08 RX ORDER — DULAGLUTIDE 3 MG/.5ML
3 INJECTION, SOLUTION SUBCUTANEOUS
Qty: 6 ML | Refills: 0 | Status: SHIPPED | OUTPATIENT
Start: 2024-03-08

## 2024-03-16 LAB
LEFT EYE DIABETIC RETINOPATHY: NORMAL
RIGHT EYE DIABETIC RETINOPATHY: NORMAL

## 2024-03-22 ENCOUNTER — VBI (OUTPATIENT)
Dept: ADMINISTRATIVE | Facility: OTHER | Age: 59
End: 2024-03-22

## 2024-03-22 NOTE — TELEPHONE ENCOUNTER
Upon review of the In Basket request and the patient's chart, initial outreach has been made via fax to facility. Please see Contacts section for details.     Thank you  Liliana Juan

## 2024-03-22 NOTE — LETTER
Diabetic Eye Exam Form    Date Requested: 24  Patient: Frank Vázquez  Patient : 1965   Referring Provider: Ginna Weber MD      DIABETIC Eye Exam Date _______________________________      Type of Exam MUST be documented for Diabetic Eye Exams. Please CHECK ONE.     Retinal Exam       Dilated Retinal Exam       OCT       Optomap-Iris Exam      Fundus Photography       Left Eye - Please check Retinopathy or No Retinopathy        Exam did show retinopathy    Exam did not show retinopathy       Right Eye - Please check Retinopathy or No Retinopathy       Exam did show retinopathy    Exam did not show retinopathy       Comments __________________________________________________________    Practice Providing Exam ______________________________________________    Exam Performed By (print name) _______________________________________      Provider Signature ___________________________________________________      These reports are needed for  compliance.  Please fax this completed form and a copy of the Diabetic Eye Exam report to our office located at [address] [place] [address] as soon as possible via Fax [phone] attention [place]: Phone [phone]  We thank you for your assistance in treating our mutual patient.    [facility] - [phone] F [phone]

## 2024-03-26 NOTE — TELEPHONE ENCOUNTER
Upon review of the In Basket request we were able to locate, review, and update the patient chart as requested for Diabetic Eye Exam.    Any additional questions or concerns should be emailed to the Practice Liaisons via the appropriate education email address, please do not reply via In Basket.    Thank you  Liliana Juan

## 2024-04-18 DIAGNOSIS — E11.65 TYPE 2 DIABETES MELLITUS WITH HYPERGLYCEMIA, WITHOUT LONG-TERM CURRENT USE OF INSULIN (HCC): ICD-10-CM

## 2024-04-18 RX ORDER — METFORMIN HYDROCHLORIDE 500 MG/1
500 TABLET, EXTENDED RELEASE ORAL
Qty: 270 TABLET | Refills: 1 | Status: SHIPPED | OUTPATIENT
Start: 2024-04-18 | End: 2024-04-19

## 2024-04-19 DIAGNOSIS — E11.65 TYPE 2 DIABETES MELLITUS WITH HYPERGLYCEMIA, WITHOUT LONG-TERM CURRENT USE OF INSULIN (HCC): ICD-10-CM

## 2024-04-19 RX ORDER — METFORMIN HYDROCHLORIDE 500 MG/1
500 TABLET, EXTENDED RELEASE ORAL
Qty: 270 TABLET | Refills: 1 | Status: SHIPPED | OUTPATIENT
Start: 2024-04-19

## 2024-05-09 ENCOUNTER — PATIENT MESSAGE (OUTPATIENT)
Dept: ENDOCRINOLOGY | Facility: CLINIC | Age: 59
End: 2024-05-09

## 2024-05-13 ENCOUNTER — TELEPHONE (OUTPATIENT)
Age: 59
End: 2024-05-13

## 2024-05-13 NOTE — TELEPHONE ENCOUNTER
Medication refill  Trulicity 3mg/0.5ml injection- 90 day supply  Jardiance mg tablets      Patient requesting refills but not refills available in chart. Please advise.     [facility]

## 2024-05-17 ENCOUNTER — TELEPHONE (OUTPATIENT)
Age: 59
End: 2024-05-17

## 2024-05-17 DIAGNOSIS — E11.65 TYPE 2 DIABETES MELLITUS WITH HYPERGLYCEMIA, WITHOUT LONG-TERM CURRENT USE OF INSULIN (HCC): ICD-10-CM

## 2024-05-17 RX ORDER — METFORMIN HYDROCHLORIDE 500 MG/1
500 TABLET, EXTENDED RELEASE ORAL
Qty: 270 TABLET | Refills: 1 | Status: SHIPPED | OUTPATIENT
Start: 2024-05-17

## 2024-05-17 RX ORDER — GLIMEPIRIDE 1 MG/1
1 TABLET ORAL
Qty: 90 TABLET | Refills: 1 | Status: SHIPPED | OUTPATIENT
Start: 2024-05-17

## 2024-05-17 RX ORDER — DULAGLUTIDE 3 MG/.5ML
3 INJECTION, SOLUTION SUBCUTANEOUS
Qty: 6 ML | Refills: 0 | Status: SHIPPED | OUTPATIENT
Start: 2024-05-17

## 2024-05-17 NOTE — TELEPHONE ENCOUNTER
PA for TRULICITY not required     Reason This approval will  2024.               Message sent to provider pool yes

## 2024-06-12 ENCOUNTER — TELEPHONE (OUTPATIENT)
Age: 59
End: 2024-06-12

## 2024-06-12 ENCOUNTER — OFFICE VISIT (OUTPATIENT)
Dept: ENDOCRINOLOGY | Facility: CLINIC | Age: 59
End: 2024-06-12
Payer: COMMERCIAL

## 2024-06-12 VITALS
SYSTOLIC BLOOD PRESSURE: 134 MMHG | RESPIRATION RATE: 12 BRPM | HEIGHT: 64 IN | HEART RATE: 86 BPM | TEMPERATURE: 97.3 F | WEIGHT: 140 LBS | BODY MASS INDEX: 23.9 KG/M2 | DIASTOLIC BLOOD PRESSURE: 82 MMHG | OXYGEN SATURATION: 97 %

## 2024-06-12 DIAGNOSIS — E78.2 MIXED HYPERLIPIDEMIA: ICD-10-CM

## 2024-06-12 DIAGNOSIS — E11.65 TYPE 2 DIABETES MELLITUS WITH HYPERGLYCEMIA, WITHOUT LONG-TERM CURRENT USE OF INSULIN (HCC): ICD-10-CM

## 2024-06-12 DIAGNOSIS — R80.9 MICROALBUMINURIA: ICD-10-CM

## 2024-06-12 DIAGNOSIS — E55.9 VITAMIN D DEFICIENCY: ICD-10-CM

## 2024-06-12 DIAGNOSIS — E11.65 TYPE 2 DIABETES MELLITUS WITH HYPERGLYCEMIA, WITHOUT LONG-TERM CURRENT USE OF INSULIN (HCC): Primary | ICD-10-CM

## 2024-06-12 PROCEDURE — 99214 OFFICE O/P EST MOD 30 MIN: CPT | Performed by: INTERNAL MEDICINE

## 2024-06-12 RX ORDER — ACYCLOVIR 400 MG/1
1 TABLET ORAL
Qty: 3 EACH | Refills: 5 | Status: SHIPPED | OUTPATIENT
Start: 2024-06-12

## 2024-06-12 RX ORDER — DULAGLUTIDE 3 MG/.5ML
3 INJECTION, SOLUTION SUBCUTANEOUS
Qty: 2 ML | Refills: 0 | Status: SHIPPED | OUTPATIENT
Start: 2024-06-12

## 2024-06-12 RX ORDER — INSULIN DEGLUDEC 100 U/ML
12 INJECTION, SOLUTION SUBCUTANEOUS
Qty: 15 ML | Refills: 1 | Status: SHIPPED | OUTPATIENT
Start: 2024-06-12

## 2024-06-12 RX ORDER — ACYCLOVIR 400 MG/1
1 TABLET ORAL CONTINUOUS
Qty: 1 EACH | Refills: 0 | Status: SHIPPED | OUTPATIENT
Start: 2024-06-12

## 2024-06-12 NOTE — ASSESSMENT & PLAN NOTE
His recent lipid panel showed elevated triglycerides - 300mg/dL range.  ASCVD risk is 13.6%.    Per guidelines he should be on statin therapy. Will review next visit.  May also need a fibrate in future.

## 2024-06-12 NOTE — ASSESSMENT & PLAN NOTE
He has lost control since running out of trulicity due to unavailability.    He tried to compensate with insulin and glimepiride and noted both hyperglycemia and hypoglycemia.    Today we agreed to continue metformin 500 mg p.o. TID, switch to mounjaro, stop glimepiride 1mg qdaily, add Basal insulin and continue jardiance 10mg qdaily.  May consider walking 10min after each meal.    Send dexcom data in 6 weeks.  Follow up in 3 months.    Lab Results   Component Value Date    HGBA1C 6.5 (H) 02/21/2024

## 2024-06-12 NOTE — PROGRESS NOTES
"    Follow-up Patient Progress Note      CC: Type 2 diabetes     History of Present Illness:   57-year-old male with type 2 diabetes >10 yrs, HTN, HLD, retinopathy, microalbuminuria, fatigue and vitamin D deficiency. Last visit was 10/27/24.     Since last visit he lost 6 lbs. he reports overall improvement since using glimepiride.     CGM data review::  Device: dexcom          Dates:2/20/24 - 2/23/24                      Usage: 70 %             Av glu: 189 mg/dL                  SD: 45 mg/dL  CV:   %            GMI:   %  TIR: 46 %                    TAR: 46+8%               TBR:   %     Glycemic patters:  pp spike after breakfast otherwise mild pp hyperglycemia and fasting normoglycemia.  Hypoglycemia: No     Current meds:  metformin 500 mg p.o. twice daily  Jardiance 10mg  Glimepiride 1 mg daily with breakfast     Opthamology: yes -no retinopathy  Podiatry:   vaccination: yes  Dental:  Pancreatitis: No     Ace/ARB: No  Statin: No  Thyroid issues: No      Physical Exam:  Body mass index is 24.03 kg/m².  /82 (BP Location: Left arm, Patient Position: Sitting)   Pulse 86   Temp (!) 97.3 °F (36.3 °C) (Tympanic)   Resp 12   Ht 5' 4\" (1.626 m)   Wt 63.5 kg (140 lb)   SpO2 97%   BMI 24.03 kg/m²    Vitals:    06/12/24 1045   Weight: 63.5 kg (140 lb)        Physical Exam  Constitutional:       General: He is not in acute distress.     Appearance: He is well-developed.   HENT:      Head: Normocephalic and atraumatic.      Nose: Nose normal.   Eyes:      Conjunctiva/sclera: Conjunctivae normal.   Pulmonary:      Effort: Pulmonary effort is normal.   Abdominal:      General: There is no distension.   Musculoskeletal:      Cervical back: Normal range of motion and neck supple.   Skin:     Findings: No rash.      Comments: No icterus   Neurological:      Mental Status: He is alert and oriented to person, place, and time.         Labs:   Lab Results   Component Value Date    HGBA1C 6.5 (H) 02/21/2024       Lab " Results   Component Value Date    [id number] 2.165 04/25/2023       Lab Results   Component Value Date    CREATININE 1.11 02/21/2024    CREATININE 1.11 05/01/2023    CREATININE 1.10 07/26/2022    BUN 17 02/21/2024    K 4.1 02/21/2024     02/21/2024    CO2 28 02/21/2024     eGFR   Date Value Ref Range Status   02/21/2024 73 ml/min/1.73sq m Final       Lab Results   Component Value Date    ALT 47 02/21/2024    AST 29 02/21/2024    ALKPHOS 31 (L) 02/21/2024       Lab Results   Component Value Date    CHOLESTEROL 187 02/21/2024    CHOLESTEROL 218 (H) 04/25/2023    CHOLESTEROL 207 (H) 07/26/2022     Lab Results   Component Value Date    HDL 40 02/21/2024    HDL 45 04/25/2023    HDL 44 07/26/2022     Lab Results   Component Value Date    TRIG 355 (H) 02/21/2024    TRIG 371 (H) 04/25/2023    TRIG 213 (H) 07/26/2022     Lab Results   Component Value Date    NONHDLC 147 02/21/2024    NONHDLC 173 04/25/2023    NONHDLC 163 07/26/2022         Assessment/Plan:    1. Type 2 diabetes mellitus with hyperglycemia, without long-term current use of insulin (HCC)  Assessment & Plan:  He has lost control since running out of trulicity due to unavailability.    He tried to compensate with insulin and glimepiride and noted both hyperglycemia and hypoglycemia.    Today we agreed to continue metformin 500 mg p.o. TID, switch to mounjaro, stop glimepiride 1mg qdaily, add Basal insulin and continue jardiance 10mg qdaily.  May consider walking 10min after each meal.    Send dexcom data in 6 weeks.  Follow up in 3 months.    Lab Results   Component Value Date    HGBA1C 6.5 (H) 02/21/2024     Orders:  -     tirzepatide 7.5 MG/0.5ML; Inject 0.5 mL (7.5 mg total) under the skin once a week  -     insulin degludec (Tresiba FlexTouch) 100 units/mL injection pen; Inject 12 Units under the skin daily at bedtime  -     Continuous Glucose  (Dexcom G7 ) JOHN; Use 1 each continuous  -     Continuous Glucose Sensor (Dexcom G7  Sensor); Use 1 Device every 10 days  -     Hemoglobin A1C; Future  2. Vitamin D deficiency  Assessment & Plan:  Take vit d3 2000IU daily OTC.  3. Microalbuminuria  4. Mixed hyperlipidemia  Assessment & Plan:  His recent lipid panel showed elevated triglycerides - 300mg/dL range.  ASCVD risk is 13.6%.    Per guidelines he should be on statin therapy. Will review next visit.  May also need a fibrate in future.          I have spent a total time of 30 minutes on 06/12/24 in caring for this patient including greater than 50% of this time was spent in counseling/coordination of care as listed above. Spouse updated during visit.      Discussed with the patient and all questioned fully answered. He will contact me with concerns.    Sherley Espinoza

## 2024-06-12 NOTE — TELEPHONE ENCOUNTER
Patient calling, he is at the pharmacy now and they have one box of Trulicity available.  He is requesting the Trulicity be sent to [facility]. Order is D/C in chart. Please send ad call patient with confirmation

## 2024-06-13 ENCOUNTER — TELEPHONE (OUTPATIENT)
Age: 59
End: 2024-06-13

## 2024-06-13 NOTE — TELEPHONE ENCOUNTER
PA for MOUNJARO 7.5    Submitted via    []CMM-KEY    [x][employer]-Case ID # [id number]  []Faxed to plan   []Other website    []Phone call Case ID #      Office notes sent, clinical questions answered. Awaiting determination    Turnaround time for your insurance to make a decision on your Prior Authorization can take 7-21 business days.

## 2024-06-20 NOTE — TELEPHONE ENCOUNTER
The [employer] Prior Authorization Team is unable to review this request for prior authorization as there is an ongoing prior authorization case in review for this request, Case ID: [id number]

## 2024-06-26 DIAGNOSIS — E11.65 TYPE 2 DIABETES MELLITUS WITH HYPERGLYCEMIA, WITHOUT LONG-TERM CURRENT USE OF INSULIN (HCC): ICD-10-CM

## 2024-08-03 DIAGNOSIS — E11.65 TYPE 2 DIABETES MELLITUS WITH HYPERGLYCEMIA, WITHOUT LONG-TERM CURRENT USE OF INSULIN (HCC): ICD-10-CM

## 2024-08-04 RX ORDER — METFORMIN HYDROCHLORIDE 500 MG/1
500 TABLET, EXTENDED RELEASE ORAL
Qty: 270 TABLET | Refills: 1 | Status: SHIPPED | OUTPATIENT
Start: 2024-08-04

## 2024-08-09 ENCOUNTER — APPOINTMENT (OUTPATIENT)
Dept: LAB | Facility: HOSPITAL | Age: 59
End: 2024-08-09
Payer: COMMERCIAL

## 2024-08-09 DIAGNOSIS — E11.65 TYPE 2 DIABETES MELLITUS WITH HYPERGLYCEMIA, WITHOUT LONG-TERM CURRENT USE OF INSULIN (HCC): ICD-10-CM

## 2024-08-09 LAB
EST. AVERAGE GLUCOSE BLD GHB EST-MCNC: 128 MG/DL
HBA1C MFR BLD: 6.1 %

## 2024-08-09 PROCEDURE — 36415 COLL VENOUS BLD VENIPUNCTURE: CPT

## 2024-08-09 PROCEDURE — 83036 HEMOGLOBIN GLYCOSYLATED A1C: CPT

## 2024-08-13 DIAGNOSIS — E11.65 TYPE 2 DIABETES MELLITUS WITH HYPERGLYCEMIA, WITHOUT LONG-TERM CURRENT USE OF INSULIN (HCC): ICD-10-CM

## 2024-08-15 ENCOUNTER — OFFICE VISIT (OUTPATIENT)
Dept: ENDOCRINOLOGY | Facility: CLINIC | Age: 59
End: 2024-08-15
Payer: COMMERCIAL

## 2024-08-15 VITALS
SYSTOLIC BLOOD PRESSURE: 126 MMHG | HEIGHT: 64 IN | RESPIRATION RATE: 12 BRPM | WEIGHT: 124 LBS | HEART RATE: 80 BPM | BODY MASS INDEX: 21.17 KG/M2 | DIASTOLIC BLOOD PRESSURE: 84 MMHG | OXYGEN SATURATION: 98 % | TEMPERATURE: 97.9 F

## 2024-08-15 DIAGNOSIS — E55.9 VITAMIN D DEFICIENCY: ICD-10-CM

## 2024-08-15 DIAGNOSIS — R80.9 MICROALBUMINURIA: ICD-10-CM

## 2024-08-15 DIAGNOSIS — E78.2 MIXED HYPERLIPIDEMIA: ICD-10-CM

## 2024-08-15 DIAGNOSIS — E11.65 TYPE 2 DIABETES MELLITUS WITH HYPERGLYCEMIA, WITHOUT LONG-TERM CURRENT USE OF INSULIN (HCC): Primary | ICD-10-CM

## 2024-08-15 PROCEDURE — 99214 OFFICE O/P EST MOD 30 MIN: CPT | Performed by: INTERNAL MEDICINE

## 2024-08-15 PROCEDURE — 95251 CONT GLUC MNTR ANALYSIS I&R: CPT | Performed by: INTERNAL MEDICINE

## 2024-08-15 RX ORDER — INSULIN DEGLUDEC 100 U/ML
6 INJECTION, SOLUTION SUBCUTANEOUS
Qty: 15 ML | Refills: 0 | Status: SHIPPED | OUTPATIENT
Start: 2024-08-15

## 2024-08-15 NOTE — ASSESSMENT & PLAN NOTE
He has improved significantly with GMI 6.2% and Av Gluc 121mg/dL.  He lost 16 lbs with Mounjaro.    Today we agreed to continue metformin 500 mg p.o. BID, mounjaro 7.5mg weekly and reduce Tresiba 6u qhs. Continue jardiance 10mg qdaily.  May consider walking 10min after each meal.    Follow up in 6 months.    Lab Results   Component Value Date    HGBA1C 6.1 (H) 08/09/2024

## 2024-08-15 NOTE — PROGRESS NOTES
"    Follow-up Patient Progress Note      CC: Type 2 diabetes     History of Present Illness:   57-year-old male with type 2 diabetes >10 yrs, HTN, HLD, retinopathy, microalbuminuria, fatigue and vitamin D deficiency. Last visit was 10/27/24.     Since last visit he lost 6 lbs. he reports overall improvement since using glimepiride.     CGM data review::  Device: dexcom          Dates:8/2/24 - 8/15/24                      Usage: 93 %             Av glu: 121 mg/dL                  SD: 32 mg/dL  CV:   %            GMI: 6.2 %  TIR: 95 %                    TAR: 4+0%               TBR:   %     Glycemic patters:  pp spike after breakfast otherwise mild pp hyperglycemia and fasting normoglycemia.  Hypoglycemia: No     Current meds:  metformin 500 mg p.o. twice daily  Jardiance 10mg  Mounjaro 7.5mg weekly  Tresiba 6u QHS     Opthamology: yes -no retinopathy  Podiatry:   vaccination: yes  Dental:  Pancreatitis: No     Ace/ARB: No  Statin: No  Thyroid issues: No      Physical Exam:  Body mass index is 21.28 kg/m².  /84 (BP Location: Right arm, Patient Position: Sitting)   Pulse 80   Temp 97.9 °F (36.6 °C) (Tympanic)   Resp 12   Ht 5' 4\" (1.626 m)   Wt 56.2 kg (124 lb)   SpO2 98%   BMI 21.28 kg/m²    Vitals:    08/15/24 0828   Weight: 56.2 kg (124 lb)        Physical Exam  Constitutional:       General: He is not in acute distress.     Appearance: He is well-developed.   HENT:      Head: Normocephalic and atraumatic.      Nose: Nose normal.   Eyes:      Conjunctiva/sclera: Conjunctivae normal.   Pulmonary:      Effort: Pulmonary effort is normal.   Abdominal:      General: There is no distension.   Musculoskeletal:      Cervical back: Normal range of motion and neck supple.   Skin:     Findings: No rash.      Comments: No icterus   Neurological:      Mental Status: He is alert and oriented to person, place, and time.         Labs:   Lab Results   Component Value Date    HGBA1C 6.1 (H) 08/09/2024       Lab Results "   Component Value Date    PYG4OWRUBSZG 2.165 04/25/2023       Lab Results   Component Value Date    CREATININE 1.11 02/21/2024    CREATININE 1.11 05/01/2023    CREATININE 1.10 07/26/2022    BUN 17 02/21/2024    K 4.1 02/21/2024     02/21/2024    CO2 28 02/21/2024     eGFR   Date Value Ref Range Status   02/21/2024 73 ml/min/1.73sq m Final       Lab Results   Component Value Date    ALT 47 02/21/2024    AST 29 02/21/2024    ALKPHOS 31 (L) 02/21/2024       Lab Results   Component Value Date    CHOLESTEROL 187 02/21/2024    CHOLESTEROL 218 (H) 04/25/2023    CHOLESTEROL 207 (H) 07/26/2022     Lab Results   Component Value Date    HDL 40 02/21/2024    HDL 45 04/25/2023    HDL 44 07/26/2022     Lab Results   Component Value Date    TRIG 355 (H) 02/21/2024    TRIG 371 (H) 04/25/2023    TRIG 213 (H) 07/26/2022     Lab Results   Component Value Date    NONHDLC 147 02/21/2024    NONHDLC 173 04/25/2023    NONHDLC 163 07/26/2022         Assessment/Plan:    1. Type 2 diabetes mellitus with hyperglycemia, without long-term current use of insulin (HCC)  Assessment & Plan:  He has improved significantly with GMI 6.2% and Av Gluc 121mg/dL.  He lost 16 lbs with Mounjaro.    Today we agreed to continue metformin 500 mg p.o. BID, mounjaro 7.5mg weekly and reduce Tresiba 6u qhs. Continue jardiance 10mg qdaily.  May consider walking 10min after each meal.    Follow up in 6 months.    Lab Results   Component Value Date    HGBA1C 6.1 (H) 08/09/2024     Orders:  -     insulin degludec (Tresiba FlexTouch) 100 units/mL injection pen; Inject 6 Units under the skin daily at bedtime  2. Vitamin D deficiency  Assessment & Plan:  Take vit d3 2000IU daily OTC.  3. Microalbuminuria  4. Mixed hyperlipidemia  Assessment & Plan:  His recent lipid panel showed elevated triglycerides - 300mg/dL range.  ASCVD risk is 13.6%.    Per guidelines he should be on statin therapy. Will review next visit as we expect improvement with weight loss and improved  glycemia.          I have spent a total time of 35 minutes on 08/15/24 in caring for this patient including greater than 50% of this time was spent in counseling/coordination of care as listed above.       Discussed with the patient and all questioned fully answered. He will contact me with concerns.    Sherley Espinoza

## 2024-08-15 NOTE — ASSESSMENT & PLAN NOTE
His recent lipid panel showed elevated triglycerides - 300mg/dL range.  ASCVD risk is 13.6%.    Per guidelines he should be on statin therapy. Will review next visit as we expect improvement with weight loss and improved glycemia.

## 2024-09-17 ENCOUNTER — PATIENT MESSAGE (OUTPATIENT)
Dept: ENDOCRINOLOGY | Facility: CLINIC | Age: 59
End: 2024-09-17

## 2024-09-17 DIAGNOSIS — E11.65 TYPE 2 DIABETES MELLITUS WITH HYPERGLYCEMIA, WITHOUT LONG-TERM CURRENT USE OF INSULIN (HCC): Primary | ICD-10-CM

## 2024-09-17 RX ORDER — PEN NEEDLE, DIABETIC 32GX 5/32"
NEEDLE, DISPOSABLE MISCELLANEOUS
Qty: 100 EACH | Refills: 1 | Status: SHIPPED | OUTPATIENT
Start: 2024-09-17

## 2024-10-07 DIAGNOSIS — E11.65 TYPE 2 DIABETES MELLITUS WITH HYPERGLYCEMIA, WITHOUT LONG-TERM CURRENT USE OF INSULIN (HCC): Primary | ICD-10-CM

## 2024-12-09 DIAGNOSIS — E11.65 TYPE 2 DIABETES MELLITUS WITH HYPERGLYCEMIA, WITHOUT LONG-TERM CURRENT USE OF INSULIN (HCC): ICD-10-CM

## 2024-12-11 ENCOUNTER — TELEPHONE (OUTPATIENT)
Age: 59
End: 2024-12-11

## 2024-12-11 NOTE — TELEPHONE ENCOUNTER
PA for Tirzepatide 10 MG/0.5ML SOAJ SUBMITTED to [facility]    via      [x][employer]-Case ID # [id number]      [x]PA sent as URGENT    All office notes, labs and other pertaining documents and studies sent. Clinical questions answered. Awaiting determination from insurance company.     Turnaround time for your insurance to make a decision on your Prior Authorization can take 7-21 business days.

## 2024-12-17 DIAGNOSIS — E11.65 TYPE 2 DIABETES MELLITUS WITH HYPERGLYCEMIA, WITHOUT LONG-TERM CURRENT USE OF INSULIN (HCC): ICD-10-CM

## 2024-12-17 RX ORDER — ACYCLOVIR 400 MG/1
1 TABLET ORAL
Qty: 3 EACH | Refills: 5 | Status: SHIPPED | OUTPATIENT
Start: 2024-12-17

## 2024-12-18 NOTE — TELEPHONE ENCOUNTER
PA for Tirzepatide 10 MG/0.5ML SOAJ   NOT REQUIRED     Reason The requested medication has been approved by [facility] and there is an active authorization on file         Patient advised by          [] MyChart Message  [] Phone call   []LMOM  []L/M to call office as no active Communication consent on file  []Unable to leave detailed message as VM not approved on Communication consent       Pharmacy advised by    [x]Fax  []Phone call

## 2025-01-09 DIAGNOSIS — E11.65 TYPE 2 DIABETES MELLITUS WITH HYPERGLYCEMIA, WITHOUT LONG-TERM CURRENT USE OF INSULIN (HCC): ICD-10-CM

## 2025-02-28 DIAGNOSIS — E11.65 TYPE 2 DIABETES MELLITUS WITH HYPERGLYCEMIA, WITHOUT LONG-TERM CURRENT USE OF INSULIN (HCC): ICD-10-CM

## 2025-02-28 RX ORDER — EMPAGLIFLOZIN 10 MG/1
10 TABLET, FILM COATED ORAL EVERY MORNING
Qty: 90 TABLET | Refills: 1 | Status: SHIPPED | OUTPATIENT
Start: 2025-02-28 | End: 2025-03-01 | Stop reason: SDUPTHER

## 2025-03-01 DIAGNOSIS — E11.65 TYPE 2 DIABETES MELLITUS WITH HYPERGLYCEMIA, WITHOUT LONG-TERM CURRENT USE OF INSULIN (HCC): ICD-10-CM

## 2025-03-19 ENCOUNTER — OFFICE VISIT (OUTPATIENT)
Dept: ENDOCRINOLOGY | Facility: CLINIC | Age: 60
End: 2025-03-19
Payer: COMMERCIAL

## 2025-03-19 VITALS
SYSTOLIC BLOOD PRESSURE: 108 MMHG | WEIGHT: 118 LBS | DIASTOLIC BLOOD PRESSURE: 76 MMHG | HEIGHT: 64 IN | OXYGEN SATURATION: 98 % | HEART RATE: 96 BPM | RESPIRATION RATE: 18 BRPM | TEMPERATURE: 98 F | BODY MASS INDEX: 20.14 KG/M2

## 2025-03-19 DIAGNOSIS — R80.9 MICROALBUMINURIA: ICD-10-CM

## 2025-03-19 DIAGNOSIS — E78.2 MIXED HYPERLIPIDEMIA: ICD-10-CM

## 2025-03-19 DIAGNOSIS — E11.65 TYPE 2 DIABETES MELLITUS WITH HYPERGLYCEMIA, WITHOUT LONG-TERM CURRENT USE OF INSULIN (HCC): Primary | ICD-10-CM

## 2025-03-19 DIAGNOSIS — E55.9 VITAMIN D DEFICIENCY: ICD-10-CM

## 2025-03-19 PROCEDURE — 99214 OFFICE O/P EST MOD 30 MIN: CPT | Performed by: INTERNAL MEDICINE

## 2025-03-19 PROCEDURE — 95251 CONT GLUC MNTR ANALYSIS I&R: CPT | Performed by: INTERNAL MEDICINE

## 2025-03-19 RX ORDER — ACYCLOVIR 400 MG/1
1 TABLET ORAL
Qty: 3 EACH | Refills: 5 | Status: SHIPPED | OUTPATIENT
Start: 2025-03-19

## 2025-03-19 RX ORDER — PEN NEEDLE, DIABETIC 32GX 5/32"
NEEDLE, DISPOSABLE MISCELLANEOUS
Qty: 100 EACH | Refills: 1 | Status: SHIPPED | OUTPATIENT
Start: 2025-03-19

## 2025-03-19 RX ORDER — INSULIN DEGLUDEC 100 U/ML
6 INJECTION, SOLUTION SUBCUTANEOUS
Qty: 15 ML | Refills: 0 | Status: SHIPPED | OUTPATIENT
Start: 2025-03-19

## 2025-03-19 NOTE — PROGRESS NOTES
"    Follow-up Patient Progress Note      CC: Type 2 diabetes     History of Present Illness:   57-year-old male with type 2 diabetes >10 yrs, HTN, HLD, retinopathy, microalbuminuria, fatigue and vitamin D deficiency. Last visit was 8/15/24.     Since last visit he lost 6 lbs. He reports overall improvement since using glimepiride.     CGM data review::  Device: dexcom          Dates:3/5/25 - 3/18/25                      Usage: 93 %             Av glu: 112 mg/dL                  SD: 32 mg/dL  CV:   %            GMI: 6.0 %  TIR: 95 %                    TAR: 4+0%               TBR:   %     Glycemic patters:  mild pp hyperglycemia otherwise good control.  Hypoglycemia: No     Current meds:  metformin 500 mg p.o. twice daily  Jardiance 10mg  Mounjaro 10mg weekly  Tresiba 6u QHS     Opthamology: yes -no retinopathy  Podiatry:   vaccination: yes  Dental:  Pancreatitis: No     Ace/ARB: No  Statin: No  Thyroid issues: No    Physical Exam:  Body mass index is 20.25 kg/m².  /76 (BP Location: Left arm, Patient Position: Sitting)   Pulse 96   Temp 98 °F (36.7 °C) (Tympanic)   Resp 18   Ht 5' 4\" (1.626 m)   Wt 53.5 kg (118 lb)   SpO2 98%   BMI 20.25 kg/m²    Vitals:    03/19/25 1412   Weight: 53.5 kg (118 lb)        Physical Exam  Constitutional:       General: He is not in acute distress.     Appearance: He is well-developed.   HENT:      Head: Normocephalic and atraumatic.      Nose: Nose normal.   Eyes:      Conjunctiva/sclera: Conjunctivae normal.   Pulmonary:      Effort: Pulmonary effort is normal.   Abdominal:      General: There is no distension.   Musculoskeletal:      Cervical back: Normal range of motion and neck supple.   Skin:     Findings: No rash.      Comments: No icterus   Neurological:      Mental Status: He is alert and oriented to person, place, and time.       Labs:   Lab Results   Component Value Date    HGBA1C 6.1 (H) 08/09/2024       Lab Results   Component Value Date    CAB6FPYTTREF 2.165 " 04/25/2023       Lab Results   Component Value Date    CREATININE 1.11 02/21/2024    CREATININE 1.11 05/01/2023    CREATININE 1.10 07/26/2022    BUN 17 02/21/2024    K 4.1 02/21/2024     02/21/2024    CO2 28 02/21/2024     eGFR   Date Value Ref Range Status   02/21/2024 73 ml/min/1.73sq m Final       Lab Results   Component Value Date    ALT 47 02/21/2024    AST 29 02/21/2024    ALKPHOS 31 (L) 02/21/2024       Lab Results   Component Value Date    CHOLESTEROL 187 02/21/2024    CHOLESTEROL 218 (H) 04/25/2023    CHOLESTEROL 207 (H) 07/26/2022     Lab Results   Component Value Date    HDL 40 02/21/2024    HDL 45 04/25/2023    HDL 44 07/26/2022     Lab Results   Component Value Date    TRIG 355 (H) 02/21/2024    TRIG 371 (H) 04/25/2023    TRIG 213 (H) 07/26/2022     Lab Results   Component Value Date    NONHDLC 147 02/21/2024    NONHDLC 173 04/25/2023    NONHDLC 163 07/26/2022         Assessment/Plan:    1. Type 2 diabetes mellitus with hyperglycemia, without long-term current use of insulin ([facility])  Assessment & Plan:  He has improved significantly with GMI 6.0% and Av Gluc 112mg/dL.  He lost 16 lbs with Mounjaro.    Today we agreed to continue metformin 500 mg p.o. BID, mounjaro 10mg weekly and reduce Tresiba 6u qhs. Continue jardiance 10mg qdaily.  May consider walking 10min after each meal.    Follow up in 6 months.    Lab Results   Component Value Date    HGBA1C 6.1 (H) 08/09/2024     Orders:  -     Continuous Glucose Sensor (Dexcom G7 Sensor); Use 1 Device every 10 days  -     insulin degludec (Tresiba FlexTouch) 100 units/mL injection pen; Inject 6 Units under the skin daily at bedtime  -     Empagliflozin (Jardiance) 10 MG TABS tablet; Take 1 tablet (10 mg total) by mouth every morning  -     Insulin Pen Needle (BD Pen Needle Carmen U/F) 32G X 4 MM MISC; Use once daily with pens  2. Mixed hyperlipidemia  Assessment & Plan:  His recent lipid panel showed elevated triglycerides - 300mg/dL range.  ASCVD risk is  13.6%.    Per guidelines he should be on statin therapy. Will review next visit as we expect improvement with weight loss and improved glycemia.    3. Microalbuminuria  4. Vitamin D deficiency        I have spent a total time of  minutes on 03/19/25 in caring for this patient including greater than 50% of this time was spent in counseling/coordination of care as listed above.       Discussed with the patient and all questioned fully answered. He will contact me with concerns.    Sherely Espinoza

## 2025-04-01 DIAGNOSIS — E11.65 TYPE 2 DIABETES MELLITUS WITH HYPERGLYCEMIA, WITHOUT LONG-TERM CURRENT USE OF INSULIN (HCC): ICD-10-CM

## 2025-04-04 DIAGNOSIS — E11.65 TYPE 2 DIABETES MELLITUS WITH HYPERGLYCEMIA, WITHOUT LONG-TERM CURRENT USE OF INSULIN (HCC): ICD-10-CM

## 2025-04-07 NOTE — ASSESSMENT & PLAN NOTE
He has improved significantly with GMI 6.0% and Av Gluc 112mg/dL.  He lost 16 lbs with Mounjaro.    Today we agreed to continue metformin 500 mg p.o. BID, mounjaro 10mg weekly and reduce Tresiba 6u qhs. Continue jardiance 10mg qdaily.  May consider walking 10min after each meal.    Follow up in 6 months.    Lab Results   Component Value Date    HGBA1C 6.1 (H) 08/09/2024

## 2025-04-08 ENCOUNTER — TELEPHONE (OUTPATIENT)
Dept: ENDOCRINOLOGY | Facility: CLINIC | Age: 60
End: 2025-04-08

## 2025-04-08 DIAGNOSIS — E11.65 TYPE 2 DIABETES MELLITUS WITH HYPERGLYCEMIA, WITHOUT LONG-TERM CURRENT USE OF INSULIN (HCC): Primary | ICD-10-CM

## 2025-04-09 NOTE — TELEPHONE ENCOUNTER
Please send brand Mounjaro/Zepbound as the pharmacy is confusing the generic with the two. The patient seems to be on Mounjaro but a PA for zepbound is requested    Please advise

## 2025-04-16 RX ORDER — TIRZEPATIDE 10 MG/.5ML
10 INJECTION, SOLUTION SUBCUTANEOUS WEEKLY
Qty: 2 ML | Refills: 1 | Status: SHIPPED | OUTPATIENT
Start: 2025-04-16 | End: 2025-06-11

## 2025-04-17 ENCOUNTER — TELEPHONE (OUTPATIENT)
Age: 60
End: 2025-04-17

## 2025-04-17 NOTE — TELEPHONE ENCOUNTER
PA for  (Zepbound) 10 mg/0.5 mLSUBMITTED to [employer]    via      [x][employer]-Case ID # [id number]      [x]PA sent as URGENT    All office notes, labs and other pertaining documents and studies sent. Clinical questions answered. Awaiting determination from insurance company.     Turnaround time for your insurance to make a decision on your Prior Authorization can take 7-21 business days.

## 2025-04-18 NOTE — TELEPHONE ENCOUNTER
PA for (Zepbound) 10 mg/0.5 mL  DENIED    Reason:(Screenshot if applicable)        Message sent to office clinical pool Yes    Denial letter scanned into Media Yes    Appeal started No (Provider will need to decide if appeal is warranted and send clinical documentation to Prior Authorization Team for initiation.)    **Please follow up with your patient regarding denial and next steps**

## 2025-05-30 DIAGNOSIS — E11.65 TYPE 2 DIABETES MELLITUS WITH HYPERGLYCEMIA, WITHOUT LONG-TERM CURRENT USE OF INSULIN (HCC): ICD-10-CM

## 2025-05-31 DIAGNOSIS — E11.65 TYPE 2 DIABETES MELLITUS WITH HYPERGLYCEMIA, WITHOUT LONG-TERM CURRENT USE OF INSULIN (HCC): ICD-10-CM

## 2025-06-20 DIAGNOSIS — E11.65 TYPE 2 DIABETES MELLITUS WITH HYPERGLYCEMIA, WITHOUT LONG-TERM CURRENT USE OF INSULIN (HCC): ICD-10-CM

## 2025-06-20 RX ORDER — ACYCLOVIR 400 MG/1
1 TABLET ORAL
Qty: 3 EACH | Refills: 5 | Status: SHIPPED | OUTPATIENT
Start: 2025-06-20

## 2025-06-24 ENCOUNTER — TELEPHONE (OUTPATIENT)
Dept: ENDOCRINOLOGY | Facility: CLINIC | Age: 60
End: 2025-06-24

## 2025-06-24 NOTE — TELEPHONE ENCOUNTER
PA for mounjaro 10mg SUBMITTED to [facility]    via    []CMM-KEY:   [x][employer]-Case ID #[id number]     [][employer]-Auth ID # NDC #   []Faxed to plan   []Other website   []Phone call Case ID #     [x]PA sent as URGENT    All office notes, labs and other pertaining documents and studies sent. Clinical questions answered. Awaiting determination from insurance company.     Turnaround time for your insurance to make a decision on your Prior Authorization can take 7-21 business days.

## 2025-06-26 NOTE — TELEPHONE ENCOUNTER
PA for mounjaro 10mg APPROVED     Date(s) approved 6/24/25-6/24/26    Case #[id number]     Patient advised by          [x]MyChart Message  []Phone call   []LMOM  []L/M to call office as no active Communication consent on file  []Unable to leave detailed message as VM not approved on Communication consent       Pharmacy advised by    []Fax  [x]Phone call  []Secure Chat    Specialty Pharmacy    []     Approval letter scanned into Media No